# Patient Record
Sex: MALE | Race: WHITE | URBAN - NONMETROPOLITAN AREA
[De-identification: names, ages, dates, MRNs, and addresses within clinical notes are randomized per-mention and may not be internally consistent; named-entity substitution may affect disease eponyms.]

---

## 2021-02-08 ENCOUNTER — NURSE ONLY (OUTPATIENT)
Dept: LAB | Age: 76
End: 2021-02-08

## 2021-02-08 LAB
ALBUMIN SERPL-MCNC: 3.1 G/DL (ref 3.5–5.1)
ALP BLD-CCNC: 78 U/L (ref 38–126)
ALT SERPL-CCNC: 21 U/L (ref 11–66)
ANION GAP SERPL CALCULATED.3IONS-SCNC: 12 MEQ/L (ref 8–16)
AST SERPL-CCNC: 20 U/L (ref 5–40)
BILIRUB SERPL-MCNC: 1 MG/DL (ref 0.3–1.2)
BUN BLDV-MCNC: 20 MG/DL (ref 7–22)
CALCIUM SERPL-MCNC: 9.7 MG/DL (ref 8.5–10.5)
CHLORIDE BLD-SCNC: 100 MEQ/L (ref 98–111)
CO2: 25 MEQ/L (ref 23–33)
CREAT SERPL-MCNC: 0.9 MG/DL (ref 0.4–1.2)
GFR SERPL CREATININE-BSD FRML MDRD: 82 ML/MIN/1.73M2
GLUCOSE BLD-MCNC: 140 MG/DL (ref 70–108)
POTASSIUM SERPL-SCNC: 4.8 MEQ/L (ref 3.5–5.2)
PRO-BNP: 1972 PG/ML (ref 0–1800)
SODIUM BLD-SCNC: 137 MEQ/L (ref 135–145)
TOTAL PROTEIN: 6.7 G/DL (ref 6.1–8)

## 2021-03-24 ENCOUNTER — NURSE ONLY (OUTPATIENT)
Dept: LAB | Age: 76
End: 2021-03-24

## 2021-03-24 LAB
ALBUMIN SERPL-MCNC: 3.6 G/DL (ref 3.5–5.1)
ALP BLD-CCNC: 71 U/L (ref 38–126)
ALT SERPL-CCNC: 19 U/L (ref 11–66)
ANION GAP SERPL CALCULATED.3IONS-SCNC: 16 MEQ/L (ref 8–16)
AST SERPL-CCNC: 26 U/L (ref 5–40)
BILIRUB SERPL-MCNC: 0.6 MG/DL (ref 0.3–1.2)
BUN BLDV-MCNC: 16 MG/DL (ref 7–22)
CALCIUM SERPL-MCNC: 10 MG/DL (ref 8.5–10.5)
CHLORIDE BLD-SCNC: 98 MEQ/L (ref 98–111)
CHOLESTEROL, TOTAL: 174 MG/DL (ref 100–199)
CO2: 23 MEQ/L (ref 23–33)
CREAT SERPL-MCNC: 1 MG/DL (ref 0.4–1.2)
GFR SERPL CREATININE-BSD FRML MDRD: 73 ML/MIN/1.73M2
GLUCOSE BLD-MCNC: 120 MG/DL (ref 70–108)
HDLC SERPL-MCNC: 38 MG/DL
LDL CHOLESTEROL CALCULATED: 113 MG/DL
POTASSIUM SERPL-SCNC: 4.1 MEQ/L (ref 3.5–5.2)
PROSTATE SPECIFIC ANTIGEN: 0.23 NG/ML (ref 0–1)
SODIUM BLD-SCNC: 137 MEQ/L (ref 135–145)
TOTAL PROTEIN: 7.5 G/DL (ref 6.1–8)
TRIGL SERPL-MCNC: 114 MG/DL (ref 0–199)

## 2021-10-08 ENCOUNTER — NURSE ONLY (OUTPATIENT)
Dept: LAB | Age: 76
End: 2021-10-08

## 2021-10-08 LAB
ALBUMIN SERPL-MCNC: 3.8 G/DL (ref 3.5–5.1)
ALP BLD-CCNC: 70 U/L (ref 38–126)
ALT SERPL-CCNC: 26 U/L (ref 11–66)
ANION GAP SERPL CALCULATED.3IONS-SCNC: 11 MEQ/L (ref 8–16)
AST SERPL-CCNC: 31 U/L (ref 5–40)
BILIRUB SERPL-MCNC: 0.8 MG/DL (ref 0.3–1.2)
BUN BLDV-MCNC: 19 MG/DL (ref 7–22)
CALCIUM SERPL-MCNC: 9.6 MG/DL (ref 8.5–10.5)
CHLORIDE BLD-SCNC: 103 MEQ/L (ref 98–111)
CHOLESTEROL, TOTAL: 168 MG/DL (ref 100–199)
CO2: 24 MEQ/L (ref 23–33)
CREAT SERPL-MCNC: 1 MG/DL (ref 0.4–1.2)
GFR SERPL CREATININE-BSD FRML MDRD: 72 ML/MIN/1.73M2
GLUCOSE BLD-MCNC: 128 MG/DL (ref 70–108)
HDLC SERPL-MCNC: 37 MG/DL
LDL CHOLESTEROL CALCULATED: 114 MG/DL
POTASSIUM SERPL-SCNC: 4.3 MEQ/L (ref 3.5–5.2)
PROSTATE SPECIFIC ANTIGEN: 0.23 NG/ML (ref 0–1)
SODIUM BLD-SCNC: 138 MEQ/L (ref 135–145)
TOTAL PROTEIN: 7 G/DL (ref 6.1–8)
TRIGL SERPL-MCNC: 83 MG/DL (ref 0–199)

## 2024-12-20 RX ORDER — LATANOPROST 50 UG/ML
1 SOLUTION/ DROPS OPHTHALMIC NIGHTLY
COMMUNITY
Start: 2024-10-03

## 2024-12-20 RX ORDER — MULTIVIT WITH MINERALS/LUTEIN
1000 TABLET ORAL DAILY
COMMUNITY
Start: 2024-12-05

## 2024-12-20 RX ORDER — DORZOLAMIDE HYDROCHLORIDE AND TIMOLOL MALEATE 20; 5 MG/ML; MG/ML
1 SOLUTION/ DROPS OPHTHALMIC
COMMUNITY
Start: 2024-10-03

## 2024-12-20 RX ORDER — METOPROLOL TARTRATE 25 MG/1
25 TABLET, FILM COATED ORAL 2 TIMES DAILY
COMMUNITY
Start: 2024-08-30

## 2024-12-20 RX ORDER — FLUTICASONE PROPIONATE 50 MCG
SPRAY, SUSPENSION (ML) NASAL DAILY PRN
COMMUNITY
Start: 2024-10-03

## 2024-12-20 RX ORDER — MIRABEGRON 25 MG/1
25 TABLET, FILM COATED, EXTENDED RELEASE ORAL
COMMUNITY
Start: 2024-12-06

## 2024-12-20 RX ORDER — MULTIVIT-MIN/IRON/FOLIC ACID/K 18-600-40
1000 CAPSULE ORAL DAILY
COMMUNITY

## 2024-12-20 RX ORDER — GLIPIZIDE 5 MG/1
5 TABLET ORAL
COMMUNITY
Start: 2024-11-08

## 2024-12-20 RX ORDER — DONEPEZIL HYDROCHLORIDE 10 MG/1
10 TABLET, FILM COATED ORAL NIGHTLY
COMMUNITY
Start: 2024-10-03

## 2024-12-20 RX ORDER — GABAPENTIN 300 MG/1
300 CAPSULE ORAL
COMMUNITY
Start: 2024-10-03

## 2024-12-24 ENCOUNTER — ANESTHESIA EVENT (OUTPATIENT)
Dept: OPERATING ROOM | Age: 79
End: 2024-12-24
Payer: MEDICARE

## 2024-12-24 ENCOUNTER — APPOINTMENT (OUTPATIENT)
Dept: GENERAL RADIOLOGY | Age: 79
End: 2024-12-24
Attending: UROLOGY
Payer: MEDICARE

## 2024-12-24 ENCOUNTER — HOSPITAL ENCOUNTER (OUTPATIENT)
Age: 79
Setting detail: OUTPATIENT SURGERY
Discharge: HOME | End: 2024-12-24
Attending: UROLOGY | Admitting: UROLOGY
Payer: MEDICARE

## 2024-12-24 ENCOUNTER — ANESTHESIA (OUTPATIENT)
Dept: OPERATING ROOM | Age: 79
End: 2024-12-24
Payer: MEDICARE

## 2024-12-24 VITALS
DIASTOLIC BLOOD PRESSURE: 65 MMHG | WEIGHT: 266 LBS | OXYGEN SATURATION: 100 % | SYSTOLIC BLOOD PRESSURE: 117 MMHG | TEMPERATURE: 96.8 F | HEIGHT: 72 IN | RESPIRATION RATE: 16 BRPM | BODY MASS INDEX: 36.03 KG/M2 | HEART RATE: 68 BPM

## 2024-12-24 LAB
BUN BLD-MCNC: 27 MG/DL (ref 8–26)
EGFR, POC: 76 ML/MIN/1.73M2
EKG ATRIAL RATE: 69 BPM
EKG P AXIS: 22 DEGREES
EKG P-R INTERVAL: 194 MS
EKG Q-T INTERVAL: 360 MS
EKG QRS DURATION: 90 MS
EKG QTC CALCULATION (BAZETT): 385 MS
EKG R AXIS: 0 DEGREES
EKG T AXIS: 6 DEGREES
EKG VENTRICULAR RATE: 69 BPM
GLUCOSE BLD-MCNC: 121 MG/DL (ref 74–100)
POC CREATININE: 1 MG/DL (ref 0.51–1.19)

## 2024-12-24 PROCEDURE — 7100000010 HC PHASE II RECOVERY - FIRST 15 MIN: Performed by: UROLOGY

## 2024-12-24 PROCEDURE — 2500000003 HC RX 250 WO HCPCS: Performed by: UROLOGY

## 2024-12-24 PROCEDURE — C1769 GUIDE WIRE: HCPCS | Performed by: UROLOGY

## 2024-12-24 PROCEDURE — C2617 STENT, NON-COR, TEM W/O DEL: HCPCS | Performed by: UROLOGY

## 2024-12-24 PROCEDURE — 82947 ASSAY GLUCOSE BLOOD QUANT: CPT

## 2024-12-24 PROCEDURE — 3700000000 HC ANESTHESIA ATTENDED CARE: Performed by: UROLOGY

## 2024-12-24 PROCEDURE — 93005 ELECTROCARDIOGRAM TRACING: CPT | Performed by: STUDENT IN AN ORGANIZED HEALTH CARE EDUCATION/TRAINING PROGRAM

## 2024-12-24 PROCEDURE — 84520 ASSAY OF UREA NITROGEN: CPT

## 2024-12-24 PROCEDURE — 3700000001 HC ADD 15 MINUTES (ANESTHESIA): Performed by: UROLOGY

## 2024-12-24 PROCEDURE — 82565 ASSAY OF CREATININE: CPT

## 2024-12-24 PROCEDURE — 3600000013 HC SURGERY LEVEL 3 ADDTL 15MIN: Performed by: UROLOGY

## 2024-12-24 PROCEDURE — 7100000011 HC PHASE II RECOVERY - ADDTL 15 MIN: Performed by: UROLOGY

## 2024-12-24 PROCEDURE — 6360000002 HC RX W HCPCS: Performed by: NURSE ANESTHETIST, CERTIFIED REGISTERED

## 2024-12-24 PROCEDURE — C1758 CATHETER, URETERAL: HCPCS | Performed by: UROLOGY

## 2024-12-24 PROCEDURE — 3600000003 HC SURGERY LEVEL 3 BASE: Performed by: UROLOGY

## 2024-12-24 PROCEDURE — C1747 HC ENDOSCOPE, SINGLE, URINARY TRACT: HCPCS | Performed by: UROLOGY

## 2024-12-24 PROCEDURE — 2580000003 HC RX 258: Performed by: NURSE ANESTHETIST, CERTIFIED REGISTERED

## 2024-12-24 PROCEDURE — 2709999900 HC NON-CHARGEABLE SUPPLY: Performed by: UROLOGY

## 2024-12-24 PROCEDURE — 93010 ELECTROCARDIOGRAM REPORT: CPT | Performed by: INTERNAL MEDICINE

## 2024-12-24 DEVICE — URETERAL STENT
Type: IMPLANTABLE DEVICE | Site: URETER | Status: FUNCTIONAL
Brand: PERCUFLEX™ PLUS

## 2024-12-24 RX ORDER — ONDANSETRON 2 MG/ML
4 INJECTION INTRAMUSCULAR; INTRAVENOUS
Status: DISCONTINUED | OUTPATIENT
Start: 2024-12-24 | End: 2024-12-24 | Stop reason: HOSPADM

## 2024-12-24 RX ORDER — METOCLOPRAMIDE HYDROCHLORIDE 5 MG/ML
10 INJECTION INTRAMUSCULAR; INTRAVENOUS
Status: DISCONTINUED | OUTPATIENT
Start: 2024-12-24 | End: 2024-12-24 | Stop reason: HOSPADM

## 2024-12-24 RX ORDER — MAGNESIUM HYDROXIDE 1200 MG/15ML
LIQUID ORAL CONTINUOUS PRN
Status: DISCONTINUED | OUTPATIENT
Start: 2024-12-24 | End: 2024-12-24 | Stop reason: HOSPADM

## 2024-12-24 RX ORDER — PROPOFOL 10 MG/ML
INJECTION, EMULSION INTRAVENOUS
Status: DISCONTINUED | OUTPATIENT
Start: 2024-12-24 | End: 2024-12-24 | Stop reason: SDUPTHER

## 2024-12-24 RX ORDER — FENTANYL CITRATE 50 UG/ML
25 INJECTION, SOLUTION INTRAMUSCULAR; INTRAVENOUS EVERY 5 MIN PRN
Status: DISCONTINUED | OUTPATIENT
Start: 2024-12-24 | End: 2024-12-24 | Stop reason: HOSPADM

## 2024-12-24 RX ORDER — SULFAMETHOXAZOLE AND TRIMETHOPRIM 800; 160 MG/1; MG/1
1 TABLET ORAL 2 TIMES DAILY
Qty: 6 TABLET | Refills: 0 | Status: SHIPPED | OUTPATIENT
Start: 2024-12-24 | End: 2024-12-27

## 2024-12-24 RX ORDER — SODIUM CHLORIDE 0.9 % (FLUSH) 0.9 %
5-40 SYRINGE (ML) INJECTION PRN
Status: DISCONTINUED | OUTPATIENT
Start: 2024-12-24 | End: 2024-12-24 | Stop reason: HOSPADM

## 2024-12-24 RX ORDER — NALOXONE HYDROCHLORIDE 0.4 MG/ML
INJECTION, SOLUTION INTRAMUSCULAR; INTRAVENOUS; SUBCUTANEOUS PRN
Status: DISCONTINUED | OUTPATIENT
Start: 2024-12-24 | End: 2024-12-24 | Stop reason: HOSPADM

## 2024-12-24 RX ORDER — PHENYLEPHRINE HCL IN 0.9% NACL 1 MG/10 ML
SYRINGE (ML) INTRAVENOUS
Status: DISCONTINUED | OUTPATIENT
Start: 2024-12-24 | End: 2024-12-24 | Stop reason: SDUPTHER

## 2024-12-24 RX ORDER — SODIUM CHLORIDE 9 MG/ML
INJECTION, SOLUTION INTRAVENOUS PRN
Status: DISCONTINUED | OUTPATIENT
Start: 2024-12-24 | End: 2024-12-24 | Stop reason: HOSPADM

## 2024-12-24 RX ORDER — SULFAMETHOXAZOLE AND TRIMETHOPRIM 800; 160 MG/1; MG/1
1 TABLET ORAL 2 TIMES DAILY
Qty: 6 TABLET | Refills: 0 | Status: SHIPPED | OUTPATIENT
Start: 2024-12-24 | End: 2024-12-24

## 2024-12-24 RX ORDER — LIDOCAINE HYDROCHLORIDE 10 MG/ML
INJECTION, SOLUTION INFILTRATION; PERINEURAL
Status: DISCONTINUED | OUTPATIENT
Start: 2024-12-24 | End: 2024-12-24 | Stop reason: SDUPTHER

## 2024-12-24 RX ORDER — SODIUM CHLORIDE, SODIUM LACTATE, POTASSIUM CHLORIDE, CALCIUM CHLORIDE 600; 310; 30; 20 MG/100ML; MG/100ML; MG/100ML; MG/100ML
INJECTION, SOLUTION INTRAVENOUS
Status: DISCONTINUED | OUTPATIENT
Start: 2024-12-24 | End: 2024-12-24 | Stop reason: SDUPTHER

## 2024-12-24 RX ORDER — SODIUM CHLORIDE 0.9 % (FLUSH) 0.9 %
5-40 SYRINGE (ML) INJECTION EVERY 12 HOURS SCHEDULED
Status: DISCONTINUED | OUTPATIENT
Start: 2024-12-24 | End: 2024-12-24 | Stop reason: HOSPADM

## 2024-12-24 RX ORDER — FENTANYL CITRATE 50 UG/ML
50 INJECTION, SOLUTION INTRAMUSCULAR; INTRAVENOUS EVERY 5 MIN PRN
Status: DISCONTINUED | OUTPATIENT
Start: 2024-12-24 | End: 2024-12-24 | Stop reason: HOSPADM

## 2024-12-24 RX ADMIN — PROPOFOL 125 MCG/KG/MIN: 10 INJECTION, EMULSION INTRAVENOUS at 07:36

## 2024-12-24 RX ADMIN — Medication 100 MCG: at 07:52

## 2024-12-24 RX ADMIN — LIDOCAINE HYDROCHLORIDE 50 MG: 10 INJECTION, SOLUTION INFILTRATION; PERINEURAL at 07:36

## 2024-12-24 RX ADMIN — Medication 100 MCG: at 07:50

## 2024-12-24 RX ADMIN — SODIUM CHLORIDE, POTASSIUM CHLORIDE, SODIUM LACTATE AND CALCIUM CHLORIDE: 600; 310; 30; 20 INJECTION, SOLUTION INTRAVENOUS at 06:52

## 2024-12-24 RX ADMIN — PROPOFOL 30 MG: 10 INJECTION, EMULSION INTRAVENOUS at 07:38

## 2024-12-24 RX ADMIN — Medication 3000 MG: at 07:44

## 2024-12-24 ASSESSMENT — PAIN - FUNCTIONAL ASSESSMENT: PAIN_FUNCTIONAL_ASSESSMENT: 0-10

## 2024-12-24 NOTE — ANESTHESIA PRE PROCEDURE
Department of Anesthesiology  Preprocedure Note       Name:  Irwin Lr   Age:  79 y.o.  :  1945                                          MRN:  [id number]         Date:  2024      Surgeon: Surgeon(s):  Frankie Lala MD    Procedure: Procedure(s):  CYSTOSCOPY URETEROSCOPY, STENT EXCHANGE    Medications prior to admission:   Prior to Admission medications    Medication Sig Start Date End Date Taking? Authorizing Provider   Loratadine-Pseudoephedrine (CLARITIN-D 24 HOUR PO) 1 tabs, Oral, Daily, PRN, 0 Refill(s) 10/3/24  Yes Provider, MD Heidy   Zinc Sulfate 140 (50 Zn) MG TABS daily 24  Yes Provider, MD Heidy   vitamin D 50 MCG (2000 UT) CAPS capsule Take 1,000 Units by mouth daily   Yes Provider, MD Heidy   Ascorbic Acid (VITAMIN C) 1000 MG tablet Take 1 tablet by mouth daily 24  Yes Provider, MD Heidy   apixaban (ELIQUIS) 5 MG TABS tablet Take 1 tablet by mouth 2 times daily 24  Yes Provider, MD Heidy   gabapentin (NEURONTIN) 300 MG capsule Take 1 capsule by mouth. 10/3/24  Yes Provider, MD Heidy   donepezil (ARICEPT) 10 MG tablet Take 1 tablet by mouth nightly 10/3/24  Yes Provider, MD Heidy   glipiZIDE (GLUCOTROL) 5 MG tablet Take 1 tablet by mouth 2 times daily (before meals) 24  Yes Provider, MD Heidy   metoprolol tartrate (LOPRESSOR) 25 MG tablet Take 1 tablet by mouth 2 times daily 24  Yes Provider, MD Heidy   latanoprost (XALATAN) 0.005 % ophthalmic solution 1 drop nightly 10/3/24  Yes Provider, MD Heidy   fluticasone (FLONASE) 50 MCG/ACT nasal spray by Nasal route daily as needed 10/3/24  Yes Provider, MD Heidy   dorzolamide-timolol (COSOPT) 2-0.5 % ophthalmic solution 1 drop 10/3/24  Yes Provider, MD Heidy   mirabegron (MYRBETRIQ) 25 MG TB24 1 tablet 24  Yes Provider, MD Heidy   Zn-Pyg Afri-Nettle-Saw Palmet (SAW PALMETTO COMPLEX PO) 1 caps, Oral, Daily, 0 Refill(s) 10/3/24  Yes

## 2024-12-24 NOTE — DISCHARGE INSTRUCTIONS
Called patient's phone, no answer.    Called spouse's phone, spoke with spouse Aure.  States that they have not spoken to cardiologist \"WHY?\". Stressed the importance of the cardiologist being aware of blood pressure readings and recent fall.   Spouse expresses frustration, \"JUST DO SOMETHING TO FIX HIS BLOOD PRESSURE!\".  Discussed holding his metoprolol. Spouse agreeable, patient in background agrees.  Inquired regarding patient's fluid intake. Spouse reports \"not good\". Asked for approximate amount. Spouse states, \"maybe 12 oz a day, it's always been an issue\".    Encouraged patient to take sips throughout the day.     Spouse reported back to writer to hold metoprolol, call cardiologist Halle at [facility] tomorrow and continue to work on fluid intake.   Ureteroscopy and stent placement:  You may see blood in the urine after the procedure.  This should resolve over the next couple days.  Please stay hydrated.  You may see intermittent blood in the urine while the stent is in place.  This is expected.  You may experience flank pain, and/or frequency/urgency of urination while the stent is in place.    Pt ok to discharge home in good condition  No heavy lifting, >10 lbs for today  Pt should avoid strenuous activity for today  Pt should walk moderately at home  Pt ok to shower   Pt may resume diet as tolerated  Pt should take Rx as directed  No driving while on narcotics  Please call attending physician or hospital  with questions  Call or Present to ED if fever (> 101F), intractable nausea vomiting or pain.  Rx in chart    Pt should follow up with Dr. Lala, in 1-2 weeks, call to confirm appointment

## 2024-12-24 NOTE — ANESTHESIA POSTPROCEDURE EVALUATION
Department of Anesthesiology  Postprocedure Note    Patient: Irwin Lr  MRN: [id number]  YOB: 1945  Date of evaluation: 12/24/2024    Procedure Summary       Date: 12/24/24 Room / Location: [address]    Anesthesia Start: 0732 Anesthesia Stop: 0806    Procedure: CYSTOSCOPY LEFT URETEROSCOPY,  LEFT STENT EXCHANGE (Left: Bladder) Diagnosis:       Urothelial cancer (HCC)      (Urothelial cancer (HCC) [C68.9])    Surgeons: Frankie Lala MD Responsible Provider: Maria Antonia Fernandez MD    Anesthesia Type: MAC ASA Status: 3            Anesthesia Type: MAC    [place] Phase I:      [place] Phase II: [place] Score: 10    Anesthesia Post Evaluation    Patient location during evaluation: PACU  Patient participation: complete - patient participated  Level of consciousness: awake and alert  Pain score: 2  Airway patency: patent  Nausea & Vomiting: no nausea and no vomiting  Cardiovascular status: hemodynamically stable  Respiratory status: acceptable  Hydration status: euvolemic  Pain management: adequate    No notable events documented.

## 2024-12-24 NOTE — OP NOTE
bladder did have significant bleeding, did not get a full view but did not note any obvious tumors or other lesions.  The distal curl of the left ureteral stent was in position.  We used a stent grasper to remove this to the urethral meatus.  We cannulated it with a Glidewire, advancing into the renal pelvis under fluoroscopy.  Using a 10 Samoan dual-lumen catheter, we placed a second wire.  Over our working wire, we advanced a flexible disposable ureteroscope into the renal pelvis with ease.  We then removed the working wire and began pyeloscopy.  We noted an upper pole tumor, approximately 2 cm with papillary features, appeared low-grade.  We noted another satellite lesion of the UPJ.  We moved our scope down the ureter and noted the known distal ureteral tumor with obstructive nature, papillary features with surface calcifications.  The scope was fully removed.  We then backloaded the cystoscope on the wire and placed a 5 Samoan x 28 cm double-J ureteral stent to the usual fashion without strings, noting good proximal and distal curl.  The scope was removed and bladder drained.  The patient was awoken and taken the PACU in good condition.    Please note that Dr. Lala was present for all critical portions of the procedure.    Plan:  Will discharge with a short course of antibiotics, follow-up with Dr. Lala in 1 to 2 weeks to discuss results and next steps.    Fernando Berger MD  Urology Resident, PGY-4

## 2024-12-24 NOTE — H&P
William Khan, Agustina Waller Mostafa, Kev, Radha Jr.  Urology History & Physical      Patient:  Irwin Lr  MRN: [id number]  YOB: 1945    CHIEF COMPLAINT: Concern for left ureteral tumor    HISTORY OF PRESENT ILLNESS:   The patient is a 79 y.o. male with history of bladder cancer, status post TURBT, presenting with outside images demonstrating left hydroureteronephrosis down to the level of the UVJ, biopsy did reveal urothelial carcinoma.    Patient's old records, notes and chart reviewed and summarized above.    Past Medical History:    Past Medical History:   Diagnosis Date    Arthritis     general    Atrial fibrillation (HCC)     BPH (benign prostatic hypertrophy)     Cancer (HCC)     bladder    Cataract, left eye     COVID 2020    Diabetes mellitus (HCC)     GERD (gastroesophageal reflux disease)     mild    Hx of blood clots     PE when he had covid in 2020    Hypertension     PONV (postoperative nausea and vomiting)     Seasonal allergies     Under care of team 12/20/2024    PCP: Martin Jean    Under care of team 12/20/2024    Cardiologist: Alicia Ceja    Urothelial cancer ([facility])     Wears hearing aid        Past Surgical History:    Past Surgical History:   Procedure Laterality Date    BLADDER TUMOR EXCISION  08/18/2016    TUR    COLONOSCOPY      CYSTOSCOPY      CYSTOSCOPY  09/28/2016    with TUR-BT fulgerration    CYSTOURETHROSCOPY/URETHRAL DILATION Right 12/21/2016    stent exchange    HAND TENDON SURGERY Left     HERNIA REPAIR  2009    umbilical       Medications:    No current facility-administered medications for this encounter.    Allergies:  No Known Allergies    Social History:   Social History     Socioeconomic History    Marital status:      Spouse name: Not on file    Number of children: Not on file    Years of education: Not on file    Highest education level: Not on file   Occupational History    Not on file   Tobacco Use    Smoking status:  Former     Current packs/day: 0.00     Types: Cigarettes     Quit date: 1986     Years since quittin.2    Smokeless tobacco: Not on file   Substance and Sexual Activity    Alcohol use: No    Drug use: No    Sexual activity: Not on file   Other Topics Concern    Not on file   Social History Narrative    Not on file     Social Determinants of Health     Financial Resource Strain: Not on file   Food Insecurity: Not on file   Transportation Needs: Not on file   Physical Activity: Not on file   Stress: Not on file   Social Connections: Not on file   Intimate Partner Violence: Not on file   Housing Stability: Not on file       Family History:    Family History   Problem Relation Age of Onset    Cancer Mother     Cancer Father        REVIEW OF SYSTEMS:  A comprehensive 14 point review of systems was obtained.  Constitutional: No fatigue  Eyes: No blurry vision  Ears, nose, mouth, throat, face: No ringing in the ears; no facial droop.  Respiratory: No cough or cold.  Cardiovascular: No palpitations  Gastrointestinal: No diarrhea or constipation.  Genitourinary: No burning with urination  Integument/Skin: No rashes  Hematologic/Lymphatic: No easy bruising  Musculoskeletal: No muscle pains  Neurologic: No weakness in the extremities.   Psychiatric: No depression or suicidal thoughts.  Endocrine: No heat or cold intolerances.  Allergic/Immunologic: No current seasonal allergies; no skin hives.      Physical Exam:    This a 79 y.o. Male     Constitutional: Patient in no acute distress.  Neuro: alert and oriented to person place and time.  HEENT: No acute abnormalities  Lungs: Respiratory effort normal on room air  Cardiovascular:  Heart rate regular rate and rhythm  Abdomen: Soft, non-tender, non-distended. Non peritonitic  Extremities: No leg swelling, no edema  : No Mccoy catheter    Labs:  No results for input(s): \"WBC\", \"HGB\", \"HCT\", \"MCV\", \"PLT\" in the last 72 hours.  No results for input(s): \"NA\", \"K\", \"CL\",

## 2024-12-27 LAB — GLUCOSE BLD-MCNC: 120 MG/DL (ref 75–110)

## 2025-03-05 RX ORDER — SODIUM CHLORIDE, SODIUM LACTATE, POTASSIUM CHLORIDE, CALCIUM CHLORIDE 600; 310; 30; 20 MG/100ML; MG/100ML; MG/100ML; MG/100ML
INJECTION, SOLUTION INTRAVENOUS CONTINUOUS
OUTPATIENT
Start: 2025-03-05

## 2025-03-05 NOTE — DISCHARGE INSTRUCTIONS
Pre-operative Instructions    Please arrive at the surgery center by 10:00 AM on 3/26/2025  (or as directed by your surgeon's office). See Directons to Surgery Center below.                FASTING    NOTHING TO EAT OR DRINK AFTER MIDNIGHT the night prior to surgery (This includes gum, candy, mints, chewing tobacco, etc).   (Follow bowel prep instructions if instructed by your surgeon.)                MEDICATIONS    What to STOP: ANY BLOOD THINNING MEDICATION(S) as directed by your surgeon or prescribing physician.  FAILURE TO STOP CERTAIN MEDICATIONS MAY INTERFERE WITH YOUR SCHEDULED SURGERY.      According to the medication list you provided today, PLEASE STOP: ELIQUIS AS DIRECTED BY YOUR PRESCRIBING PROVIDER; ibuprofen    2. What to CONTINUE leading up to your surgery:   Please take all your other daily medications except the medications listed above that you were instructed to hold.    3. What to TAKE MORNING OF SURGERY with SMALL SIP OF WATER: metoprolol (Lopressor), gabapentin (Neurontin), donepezil (Aricept)    Hold lisinopril morning of surgery.    PLEASE NOTE: ANY \"STOPPED\" MEDICATIONS MAY BE DUE TO CLEANING UP MEDICATION LIST DURING THIS VISIT.                        IF APPLICABLE:  -If you have been given a blood band, you must bring it with you the day of surgery, unclasped.  -Use routine inhalers and bring inhalers the day of surgery.   -Bring C-Pap/Bi-pap with you morning of surgery if planning on staying in the hospital overnight.  -Do not take diabetic medications on the day of surgery.  -Any weekly antidiabetic injections must be stopped one week prior to surgery and plan discussed with prescribing provider.                             OTHER IMPORTANT REMINDERS    1) You may be required to provide a urine sample upon your arrival to the pre-op area, so please take this into consideration.     2) If  NOT planning on staying in the hospital overnight :    A.You will need an adult family member

## 2025-03-10 ENCOUNTER — HOSPITAL ENCOUNTER (OUTPATIENT)
Dept: PREADMISSION TESTING | Age: 80
Discharge: HOME | End: 2025-03-14
Payer: MEDICARE

## 2025-03-10 VITALS
HEART RATE: 70 BPM | SYSTOLIC BLOOD PRESSURE: 154 MMHG | TEMPERATURE: 98.4 F | HEIGHT: 70 IN | DIASTOLIC BLOOD PRESSURE: 83 MMHG | RESPIRATION RATE: 18 BRPM | WEIGHT: 266 LBS | OXYGEN SATURATION: 97 % | BODY MASS INDEX: 38.08 KG/M2

## 2025-03-10 LAB
ABO + RH BLD: NORMAL
ANION GAP SERPL CALCULATED.3IONS-SCNC: 10 MMOL/L (ref 9–16)
BLOOD BANK SAMPLE EXPIRATION: NORMAL
BLOOD GROUP ANTIBODIES SERPL: NEGATIVE
BUN SERPL-MCNC: 23 MG/DL (ref 8–23)
CALCIUM SERPL-MCNC: 9.3 MG/DL (ref 8.6–10.4)
CHLORIDE SERPL-SCNC: 108 MMOL/L (ref 98–107)
CO2 SERPL-SCNC: 22 MMOL/L (ref 20–31)
CREAT SERPL-MCNC: 0.9 MG/DL (ref 0.7–1.2)
ERYTHROCYTE [DISTWIDTH] IN BLOOD BY AUTOMATED COUNT: 13.6 % (ref 11.8–14.4)
GFR, ESTIMATED: 86 ML/MIN/1.73M2
GLUCOSE SERPL-MCNC: 94 MG/DL (ref 74–99)
HCT VFR BLD AUTO: 47.4 % (ref 40.7–50.3)
HGB BLD-MCNC: 15.1 G/DL (ref 13–17)
MCH RBC QN AUTO: 31.7 PG (ref 25.2–33.5)
MCHC RBC AUTO-ENTMCNC: 31.9 G/DL (ref 28.4–34.8)
MCV RBC AUTO: 99.6 FL (ref 82.6–102.9)
NRBC BLD-RTO: 0 PER 100 WBC
PLATELET # BLD AUTO: 237 K/UL (ref 138–453)
PMV BLD AUTO: 8.8 FL (ref 8.1–13.5)
POTASSIUM SERPL-SCNC: 4.4 MMOL/L (ref 3.7–5.3)
RBC # BLD AUTO: 4.76 M/UL (ref 4.21–5.77)
SODIUM SERPL-SCNC: 140 MMOL/L (ref 136–145)
WBC OTHER # BLD: 8.3 K/UL (ref 3.5–11.3)

## 2025-03-10 PROCEDURE — 87086 URINE CULTURE/COLONY COUNT: CPT

## 2025-03-10 PROCEDURE — 86850 RBC ANTIBODY SCREEN: CPT

## 2025-03-10 PROCEDURE — 85027 COMPLETE CBC AUTOMATED: CPT

## 2025-03-10 PROCEDURE — 86901 BLOOD TYPING SEROLOGIC RH(D): CPT

## 2025-03-10 PROCEDURE — 36415 COLL VENOUS BLD VENIPUNCTURE: CPT

## 2025-03-10 PROCEDURE — 86900 BLOOD TYPING SEROLOGIC ABO: CPT

## 2025-03-10 PROCEDURE — 93005 ELECTROCARDIOGRAM TRACING: CPT | Performed by: STUDENT IN AN ORGANIZED HEALTH CARE EDUCATION/TRAINING PROGRAM

## 2025-03-10 PROCEDURE — 80048 BASIC METABOLIC PNL TOTAL CA: CPT

## 2025-03-10 RX ORDER — ERYTHROMYCIN 5 MG/G
OINTMENT OPHTHALMIC NIGHTLY
COMMUNITY

## 2025-03-10 RX ORDER — HEPARIN SODIUM 5000 [USP'U]/ML
5000 INJECTION, SOLUTION INTRAVENOUS; SUBCUTANEOUS ONCE
OUTPATIENT
Start: 2025-03-10 | End: 2025-03-10

## 2025-03-10 NOTE — H&P
History and Physical    Pt Name: Irwin Lr  MRN: [id number]  YOB: 1945  Date of evaluation: 3/10/2025  Primary Care Physician: Washington Clement MD    SUBJECTIVE:   History of Chief Complaint:    Irwin Lr is a 79 y.o. male who presents for PAT appointment. Patient is a vague historian but reports he noticed hematuria in December of 2024. He denies any pain and states he is able to urinate without issues. He follows with urology due to history of bladder cancer. Patient was recently diagnosed with urothelial cancer. Patient has been scheduled for     ROBOTIC LAPAROSCOPIC NEPHROURETERECTOMY (GEMZAR ORDERED) - Left     Allergies  has no known allergies.  Medications  Prior to Admission medications    Medication Sig Start Date End Date Taking? Authorizing Provider   erythromycin (ROMYCIN) 5 MG/GM ophthalmic ointment Place into both eyes nightly   Yes Heidy Cervantes MD   carboxymethylcellulose 1 % ophthalmic solution Place 1 drop into both eyes 3 times daily   Yes Heidy Cervantes MD   Loratadine-Pseudoephedrine (CLARITIN-D 24 HOUR PO) 1 tabs, Oral, Daily, PRN, 0 Refill(s) 10/3/24  Yes Heidy Cervantes MD   Zinc Sulfate 140 (50 Zn) MG TABS daily 12/5/24  Yes Heidy Cervantes MD   vitamin D 50 MCG (2000 UT) CAPS capsule Take 1,000 Units by mouth daily   Yes Heidy Cervantes MD   Ascorbic Acid (VITAMIN C) 1000 MG tablet Take 1 tablet by mouth daily 12/5/24  Yes Heidy Cervantes MD   gabapentin (NEURONTIN) 300 MG capsule Take 1 capsule by mouth. 10/3/24  Yes Heidy Cervantes MD   donepezil (ARICEPT) 10 MG tablet Take 1 tablet by mouth nightly 10/3/24  Yes Heidy Cervantes MD   glipiZIDE (GLUCOTROL) 5 MG tablet Take 1 tablet by mouth 2 times daily (before meals) 11/8/24  Yes Heidy Cervantes MD   metoprolol tartrate (LOPRESSOR) 25 MG tablet Take 1 tablet by mouth 2 times daily 8/30/24  Yes Heidy Cervantse MD   latanoprost (XALATAN) 0.005 %

## 2025-03-10 NOTE — H&P (VIEW-ONLY)
History and Physical    Pt Name: Irwin Lr  MRN: [id number]  YOB: 1945  Date of evaluation: 3/10/2025  Primary Care Physician: Washington Clement MD    SUBJECTIVE:   History of Chief Complaint:    Irwin Lr is a 79 y.o. male who presents for PAT appointment. Patient is a vague historian but reports he noticed hematuria in December of 2024. He denies any pain and states he is able to urinate without issues. He follows with urology due to history of bladder cancer. Patient was recently diagnosed with urothelial cancer. Patient has been scheduled for     ROBOTIC LAPAROSCOPIC NEPHROURETERECTOMY (GEMZAR ORDERED) - Left     Allergies  has no known allergies.  Medications  Prior to Admission medications    Medication Sig Start Date End Date Taking? Authorizing Provider   erythromycin (ROMYCIN) 5 MG/GM ophthalmic ointment Place into both eyes nightly   Yes Heidy Cervantes MD   carboxymethylcellulose 1 % ophthalmic solution Place 1 drop into both eyes 3 times daily   Yes Heidy Cervantes MD   Loratadine-Pseudoephedrine (CLARITIN-D 24 HOUR PO) 1 tabs, Oral, Daily, PRN, 0 Refill(s) 10/3/24  Yes Heidy Cervantes MD   Zinc Sulfate 140 (50 Zn) MG TABS daily 12/5/24  Yes Heidy Cervantes MD   vitamin D 50 MCG (2000 UT) CAPS capsule Take 1,000 Units by mouth daily   Yes Heidy Cervantes MD   Ascorbic Acid (VITAMIN C) 1000 MG tablet Take 1 tablet by mouth daily 12/5/24  Yes Heidy Cervantes MD   gabapentin (NEURONTIN) 300 MG capsule Take 1 capsule by mouth. 10/3/24  Yes Heidy Cervantes MD   donepezil (ARICEPT) 10 MG tablet Take 1 tablet by mouth nightly 10/3/24  Yes Heidy Cervantes MD   glipiZIDE (GLUCOTROL) 5 MG tablet Take 1 tablet by mouth 2 times daily (before meals) 11/8/24  Yes Heidy Cervantes MD   metoprolol tartrate (LOPRESSOR) 25 MG tablet Take 1 tablet by mouth 2 times daily 8/30/24  Yes Heidy Cervantes MD   latanoprost (XALATAN) 0.005 %    EXTREMITIES: No edema to bilateral lower extremities.  No varicosities to bilateral lower extremities.   NEUROLOGIC: CN II-XII are grossly intact. Gait is smooth.  Testing:   EKG: 3/10/2025  Labs pending: drawn 3/10/2025   IMPRESSIONS:   Urothelial cancer.   PLANS:       ROBOTIC LAPAROSCOPIC NEPHROURETERECTOMY (GEMZAR ORDERED) - Left       STACI Byrne - CNP  Electronically signed 3/10/2025 at 1:57 PM

## 2025-03-10 NOTE — PROGRESS NOTES
Anesthesia Focused Assessment    Hx of anesthesia complications:  PONV x 1 occurrence \"many years ago\"  Family hx of anesthesia complications:  no      Tested positive for Covid-19 in last 8 weeks: no  Upper respiratory infection within the last 4 weeks: no      STOP-BANG Sleep Apnea Questionnaire    SNORE loudly (heard through closed doors)?   Yes  TIRED, fatigued, sleepy during daytime?    No  OBSERVED stopping breathing during sleep?   No  High blood PRESSURE or being treated?    Yes    BMI over 35?        Yes  AGE over 50?        Yes  NECK circumference over 16\"?     No  GENDER (male)?       Yes             Total 5  High risk 5-8  Intermediate risk 3-4  Low risk 0-2    ----------------------------------------------------------------------------------------------------------------------  ROBBIE                              No  If yes, machine?      No    DM1                                            No  DM2                   Yes, oral meds    Coronary Artery Disease      No  HTN         Yes, on meds  Defib/AICD/Pacemaker               No             Renal Failure                   No  If yes, on dialysis           Active smoker?       No  Drinks alcohol?       No  Illicit drugs?        No  Dentition?        benign      Past Medical History:   Diagnosis Date    Arthritis     general    Atrial fibrillation (HCC)     resolved    BPH (benign prostatic hypertrophy)     Cancer (HCC)     bladder    Cataract, left eye     COVID 2020    Diabetes mellitus (HCC)     GERD (gastroesophageal reflux disease)     mild    Hx of blood clots     PE when he had covid in 2020    Hypertension     PONV (postoperative nausea and vomiting)     Seasonal allergies     Under care of service provider     [id number]-[id number]-last visit dec 2024    Under care of team     PCP: Martin Jean-due to visit march 17, 2025    Under care of team     Cardiologist: Alicia Ceja-last visit june 2024    Urothelial cancer (HCC)     Wears

## 2025-03-11 LAB
EKG ATRIAL RATE: 65 BPM
EKG P AXIS: 47 DEGREES
EKG P-R INTERVAL: 196 MS
EKG Q-T INTERVAL: 370 MS
EKG QRS DURATION: 90 MS
EKG QTC CALCULATION (BAZETT): 384 MS
EKG R AXIS: 27 DEGREES
EKG T AXIS: 40 DEGREES
EKG VENTRICULAR RATE: 65 BPM
MICROORGANISM SPEC CULT: NO GROWTH
SPECIMEN DESCRIPTION: NORMAL

## 2025-03-26 ENCOUNTER — ANESTHESIA (OUTPATIENT)
Dept: OPERATING ROOM | Age: 80
DRG: 658 | End: 2025-03-26
Payer: MEDICARE

## 2025-03-26 ENCOUNTER — ANESTHESIA EVENT (OUTPATIENT)
Dept: OPERATING ROOM | Age: 80
DRG: 658 | End: 2025-03-26
Payer: MEDICARE

## 2025-03-26 ENCOUNTER — HOSPITAL ENCOUNTER (INPATIENT)
Age: 80
LOS: 1 days | Discharge: HOME | DRG: 657 | End: 2025-03-27
Attending: UROLOGY | Admitting: UROLOGY
Payer: MEDICARE

## 2025-03-26 DIAGNOSIS — C68.9 UROTHELIAL CANCER (HCC): ICD-10-CM

## 2025-03-26 PROBLEM — C64.2 UROTHELIAL CARCINOMA OF KIDNEY, LEFT: Status: ACTIVE | Noted: 2025-03-26

## 2025-03-26 LAB
ANION GAP SERPL CALCULATED.3IONS-SCNC: 14 MMOL/L (ref 9–16)
BUN SERPL-MCNC: 21 MG/DL (ref 8–23)
CALCIUM SERPL-MCNC: 9 MG/DL (ref 8.6–10.4)
CHLORIDE SERPL-SCNC: 103 MMOL/L (ref 98–107)
CO2 SERPL-SCNC: 21 MMOL/L (ref 20–31)
CREAT SERPL-MCNC: 1.2 MG/DL (ref 0.7–1.2)
ERYTHROCYTE [DISTWIDTH] IN BLOOD BY AUTOMATED COUNT: 12.9 % (ref 11.8–14.4)
GFR, ESTIMATED: 61 ML/MIN/1.73M2
GLUCOSE BLD-MCNC: 102 MG/DL (ref 75–110)
GLUCOSE BLD-MCNC: 150 MG/DL (ref 75–110)
GLUCOSE SERPL-MCNC: 148 MG/DL (ref 74–99)
HCT VFR BLD AUTO: 44.6 % (ref 40.7–50.3)
HGB BLD-MCNC: 14.4 G/DL (ref 13–17)
MAGNESIUM SERPL-MCNC: 1.9 MG/DL (ref 1.6–2.4)
MCH RBC QN AUTO: 31.5 PG (ref 25.2–33.5)
MCHC RBC AUTO-ENTMCNC: 32.3 G/DL (ref 28.4–34.8)
MCV RBC AUTO: 97.6 FL (ref 82.6–102.9)
NRBC BLD-RTO: 0 PER 100 WBC
PHOSPHATE SERPL-MCNC: 4.2 MG/DL (ref 2.5–4.5)
PLATELET # BLD AUTO: 225 K/UL (ref 138–453)
PMV BLD AUTO: 8.8 FL (ref 8.1–13.5)
POTASSIUM SERPL-SCNC: 4.6 MMOL/L (ref 3.7–5.3)
RBC # BLD AUTO: 4.57 M/UL (ref 4.21–5.77)
SODIUM SERPL-SCNC: 138 MMOL/L (ref 136–145)
WBC OTHER # BLD: 12.2 K/UL (ref 3.5–11.3)

## 2025-03-26 PROCEDURE — 85027 COMPLETE CBC AUTOMATED: CPT

## 2025-03-26 PROCEDURE — 3700000001 HC ADD 15 MINUTES (ANESTHESIA): Performed by: UROLOGY

## 2025-03-26 PROCEDURE — 36415 COLL VENOUS BLD VENIPUNCTURE: CPT

## 2025-03-26 PROCEDURE — 2500000003 HC RX 250 WO HCPCS: Performed by: NURSE ANESTHETIST, CERTIFIED REGISTERED

## 2025-03-26 PROCEDURE — 8E0W4CZ ROBOTIC ASSISTED PROCEDURE OF TRUNK REGION, PERCUTANEOUS ENDOSCOPIC APPROACH: ICD-10-PCS | Performed by: UROLOGY

## 2025-03-26 PROCEDURE — S2900 ROBOTIC SURGICAL SYSTEM: HCPCS | Performed by: UROLOGY

## 2025-03-26 PROCEDURE — 2500000003 HC RX 250 WO HCPCS: Performed by: UROLOGY

## 2025-03-26 PROCEDURE — 3700000000 HC ANESTHESIA ATTENDED CARE: Performed by: UROLOGY

## 2025-03-26 PROCEDURE — 82947 ASSAY GLUCOSE BLOOD QUANT: CPT

## 2025-03-26 PROCEDURE — 76942 ECHO GUIDE FOR BIOPSY: CPT | Performed by: STUDENT IN AN ORGANIZED HEALTH CARE EDUCATION/TRAINING PROGRAM

## 2025-03-26 PROCEDURE — 83735 ASSAY OF MAGNESIUM: CPT

## 2025-03-26 PROCEDURE — 7100000001 HC PACU RECOVERY - ADDTL 15 MIN: Performed by: UROLOGY

## 2025-03-26 PROCEDURE — 3600000019 HC SURGERY ROBOT ADDTL 15MIN: Performed by: UROLOGY

## 2025-03-26 PROCEDURE — 2580000003 HC RX 258

## 2025-03-26 PROCEDURE — 6360000002 HC RX W HCPCS: Performed by: UROLOGY

## 2025-03-26 PROCEDURE — 2720000010 HC SURG SUPPLY STERILE: Performed by: UROLOGY

## 2025-03-26 PROCEDURE — 2500000003 HC RX 250 WO HCPCS

## 2025-03-26 PROCEDURE — 2709999900 HC NON-CHARGEABLE SUPPLY: Performed by: UROLOGY

## 2025-03-26 PROCEDURE — 6360000002 HC RX W HCPCS: Performed by: NURSE ANESTHETIST, CERTIFIED REGISTERED

## 2025-03-26 PROCEDURE — 80048 BASIC METABOLIC PNL TOTAL CA: CPT

## 2025-03-26 PROCEDURE — 0TT74ZZ RESECTION OF LEFT URETER, PERCUTANEOUS ENDOSCOPIC APPROACH: ICD-10-PCS | Performed by: UROLOGY

## 2025-03-26 PROCEDURE — C1889 IMPLANT/INSERT DEVICE, NOC: HCPCS | Performed by: UROLOGY

## 2025-03-26 PROCEDURE — 6360000002 HC RX W HCPCS: Performed by: STUDENT IN AN ORGANIZED HEALTH CARE EDUCATION/TRAINING PROGRAM

## 2025-03-26 PROCEDURE — 2580000003 HC RX 258: Performed by: STUDENT IN AN ORGANIZED HEALTH CARE EDUCATION/TRAINING PROGRAM

## 2025-03-26 PROCEDURE — 88313 SPECIAL STAINS GROUP 2: CPT

## 2025-03-26 PROCEDURE — 6370000000 HC RX 637 (ALT 250 FOR IP): Performed by: STUDENT IN AN ORGANIZED HEALTH CARE EDUCATION/TRAINING PROGRAM

## 2025-03-26 PROCEDURE — 88305 TISSUE EXAM BY PATHOLOGIST: CPT

## 2025-03-26 PROCEDURE — 0TT14ZZ RESECTION OF LEFT KIDNEY, PERCUTANEOUS ENDOSCOPIC APPROACH: ICD-10-PCS | Performed by: UROLOGY

## 2025-03-26 PROCEDURE — G0378 HOSPITAL OBSERVATION PER HR: HCPCS

## 2025-03-26 PROCEDURE — 84100 ASSAY OF PHOSPHORUS: CPT

## 2025-03-26 PROCEDURE — 7100000000 HC PACU RECOVERY - FIRST 15 MIN: Performed by: UROLOGY

## 2025-03-26 PROCEDURE — 88307 TISSUE EXAM BY PATHOLOGIST: CPT

## 2025-03-26 PROCEDURE — 6360000002 HC RX W HCPCS

## 2025-03-26 PROCEDURE — 3600000009 HC SURGERY ROBOT BASE: Performed by: UROLOGY

## 2025-03-26 DEVICE — HEMOLOK L 6 CLIPS/CART
Type: IMPLANTABLE DEVICE | Status: FUNCTIONAL
Brand: WECK

## 2025-03-26 RX ORDER — MINERAL OIL AND WHITE PETROLATUM 150; 830 MG/G; MG/G
OINTMENT OPHTHALMIC PRN
Status: DISCONTINUED | OUTPATIENT
Start: 2025-03-26 | End: 2025-03-27 | Stop reason: HOSPADM

## 2025-03-26 RX ORDER — POLYETHYLENE GLYCOL 3350 17 G/17G
17 POWDER, FOR SOLUTION ORAL DAILY PRN
Status: DISCONTINUED | OUTPATIENT
Start: 2025-03-26 | End: 2025-03-27 | Stop reason: HOSPADM

## 2025-03-26 RX ORDER — SODIUM CHLORIDE, SODIUM LACTATE, POTASSIUM CHLORIDE, CALCIUM CHLORIDE 600; 310; 30; 20 MG/100ML; MG/100ML; MG/100ML; MG/100ML
INJECTION, SOLUTION INTRAVENOUS
Status: DISCONTINUED | OUTPATIENT
Start: 2025-03-26 | End: 2025-03-26 | Stop reason: SDUPTHER

## 2025-03-26 RX ORDER — ACETAMINOPHEN 325 MG/1
650 TABLET ORAL EVERY 6 HOURS
Status: DISCONTINUED | OUTPATIENT
Start: 2025-03-26 | End: 2025-03-27 | Stop reason: HOSPADM

## 2025-03-26 RX ORDER — DONEPEZIL HYDROCHLORIDE 10 MG/1
10 TABLET, FILM COATED ORAL NIGHTLY
Status: DISCONTINUED | OUTPATIENT
Start: 2025-03-27 | End: 2025-03-27 | Stop reason: HOSPADM

## 2025-03-26 RX ORDER — HYOSCYAMINE SULFATE 0.12 MG/1
0.12 TABLET SUBLINGUAL EVERY 6 HOURS PRN
Status: DISCONTINUED | OUTPATIENT
Start: 2025-03-26 | End: 2025-03-27 | Stop reason: HOSPADM

## 2025-03-26 RX ORDER — PROPOFOL 10 MG/ML
INJECTION, EMULSION INTRAVENOUS
Status: DISCONTINUED | OUTPATIENT
Start: 2025-03-26 | End: 2025-03-26 | Stop reason: SDUPTHER

## 2025-03-26 RX ORDER — FENTANYL CITRATE 50 UG/ML
INJECTION, SOLUTION INTRAMUSCULAR; INTRAVENOUS
Status: DISCONTINUED | OUTPATIENT
Start: 2025-03-26 | End: 2025-03-26 | Stop reason: SDUPTHER

## 2025-03-26 RX ORDER — GLUCAGON 1 MG/ML
1 KIT INJECTION PRN
Status: DISCONTINUED | OUTPATIENT
Start: 2025-03-26 | End: 2025-03-27 | Stop reason: HOSPADM

## 2025-03-26 RX ORDER — ONDANSETRON 2 MG/ML
4 INJECTION INTRAMUSCULAR; INTRAVENOUS EVERY 6 HOURS PRN
Status: DISCONTINUED | OUTPATIENT
Start: 2025-03-26 | End: 2025-03-27 | Stop reason: HOSPADM

## 2025-03-26 RX ORDER — SODIUM CHLORIDE 0.9 % (FLUSH) 0.9 %
5-40 SYRINGE (ML) INJECTION EVERY 12 HOURS SCHEDULED
Status: DISCONTINUED | OUTPATIENT
Start: 2025-03-26 | End: 2025-03-26 | Stop reason: HOSPADM

## 2025-03-26 RX ORDER — POTASSIUM CHLORIDE 7.45 MG/ML
10 INJECTION INTRAVENOUS PRN
Status: DISCONTINUED | OUTPATIENT
Start: 2025-03-26 | End: 2025-03-27 | Stop reason: HOSPADM

## 2025-03-26 RX ORDER — OXYCODONE HYDROCHLORIDE 5 MG/1
5 TABLET ORAL
Status: DISCONTINUED | OUTPATIENT
Start: 2025-03-26 | End: 2025-03-26 | Stop reason: HOSPADM

## 2025-03-26 RX ORDER — ONDANSETRON 4 MG/1
4 TABLET, ORALLY DISINTEGRATING ORAL EVERY 8 HOURS PRN
Status: DISCONTINUED | OUTPATIENT
Start: 2025-03-26 | End: 2025-03-27 | Stop reason: HOSPADM

## 2025-03-26 RX ORDER — OXYCODONE HYDROCHLORIDE 5 MG/1
5 TABLET ORAL EVERY 4 HOURS PRN
Status: DISCONTINUED | OUTPATIENT
Start: 2025-03-26 | End: 2025-03-27 | Stop reason: HOSPADM

## 2025-03-26 RX ORDER — SCOPOLAMINE 1 MG/3D
1 PATCH, EXTENDED RELEASE TRANSDERMAL
Status: DISCONTINUED | OUTPATIENT
Start: 2025-03-26 | End: 2025-03-27 | Stop reason: HOSPADM

## 2025-03-26 RX ORDER — MAGNESIUM SULFATE IN WATER 40 MG/ML
2000 INJECTION, SOLUTION INTRAVENOUS PRN
Status: DISCONTINUED | OUTPATIENT
Start: 2025-03-26 | End: 2025-03-27 | Stop reason: HOSPADM

## 2025-03-26 RX ORDER — DEXTROSE MONOHYDRATE 100 MG/ML
INJECTION, SOLUTION INTRAVENOUS CONTINUOUS PRN
Status: DISCONTINUED | OUTPATIENT
Start: 2025-03-26 | End: 2025-03-27 | Stop reason: HOSPADM

## 2025-03-26 RX ORDER — SODIUM CHLORIDE 0.9 % (FLUSH) 0.9 %
5-40 SYRINGE (ML) INJECTION EVERY 12 HOURS SCHEDULED
Status: DISCONTINUED | OUTPATIENT
Start: 2025-03-26 | End: 2025-03-27 | Stop reason: HOSPADM

## 2025-03-26 RX ORDER — DEXAMETHASONE SODIUM PHOSPHATE 10 MG/ML
INJECTION, SOLUTION INTRA-ARTICULAR; INTRALESIONAL; INTRAMUSCULAR; INTRAVENOUS; SOFT TISSUE
Status: DISCONTINUED | OUTPATIENT
Start: 2025-03-26 | End: 2025-03-26 | Stop reason: SDUPTHER

## 2025-03-26 RX ORDER — MIDAZOLAM HYDROCHLORIDE 2 MG/2ML
2 INJECTION, SOLUTION INTRAMUSCULAR; INTRAVENOUS ONCE
Status: DISCONTINUED | OUTPATIENT
Start: 2025-03-26 | End: 2025-03-26

## 2025-03-26 RX ORDER — NALOXONE HYDROCHLORIDE 0.4 MG/ML
INJECTION, SOLUTION INTRAMUSCULAR; INTRAVENOUS; SUBCUTANEOUS PRN
Status: DISCONTINUED | OUTPATIENT
Start: 2025-03-26 | End: 2025-03-26 | Stop reason: HOSPADM

## 2025-03-26 RX ORDER — FENTANYL CITRATE 50 UG/ML
100 INJECTION, SOLUTION INTRAMUSCULAR; INTRAVENOUS ONCE
Refills: 0 | Status: COMPLETED | OUTPATIENT
Start: 2025-03-26 | End: 2025-03-26

## 2025-03-26 RX ORDER — BUPIVACAINE HYDROCHLORIDE AND EPINEPHRINE 5; 5 MG/ML; UG/ML
INJECTION, SOLUTION PERINEURAL PRN
Status: DISCONTINUED | OUTPATIENT
Start: 2025-03-26 | End: 2025-03-26 | Stop reason: HOSPADM

## 2025-03-26 RX ORDER — METOPROLOL TARTRATE 25 MG/1
25 TABLET, FILM COATED ORAL 2 TIMES DAILY
Status: DISCONTINUED | OUTPATIENT
Start: 2025-03-26 | End: 2025-03-27 | Stop reason: HOSPADM

## 2025-03-26 RX ORDER — SODIUM CHLORIDE 0.9 % (FLUSH) 0.9 %
5-40 SYRINGE (ML) INJECTION PRN
Status: DISCONTINUED | OUTPATIENT
Start: 2025-03-26 | End: 2025-03-26 | Stop reason: HOSPADM

## 2025-03-26 RX ORDER — APREPITANT 40 MG/1
40 CAPSULE ORAL ONCE
Status: COMPLETED | OUTPATIENT
Start: 2025-03-26 | End: 2025-03-26

## 2025-03-26 RX ORDER — SODIUM CHLORIDE 9 MG/ML
INJECTION, SOLUTION INTRAVENOUS PRN
Status: DISCONTINUED | OUTPATIENT
Start: 2025-03-26 | End: 2025-03-27 | Stop reason: HOSPADM

## 2025-03-26 RX ORDER — SODIUM CHLORIDE, SODIUM LACTATE, POTASSIUM CHLORIDE, CALCIUM CHLORIDE 600; 310; 30; 20 MG/100ML; MG/100ML; MG/100ML; MG/100ML
INJECTION, SOLUTION INTRAVENOUS CONTINUOUS
Status: DISCONTINUED | OUTPATIENT
Start: 2025-03-26 | End: 2025-03-26 | Stop reason: HOSPADM

## 2025-03-26 RX ORDER — LIDOCAINE HYDROCHLORIDE 10 MG/ML
INJECTION, SOLUTION EPIDURAL; INFILTRATION; INTRACAUDAL; PERINEURAL
Status: DISCONTINUED | OUTPATIENT
Start: 2025-03-26 | End: 2025-03-26 | Stop reason: SDUPTHER

## 2025-03-26 RX ORDER — LABETALOL HYDROCHLORIDE 5 MG/ML
10 INJECTION, SOLUTION INTRAVENOUS
Status: DISCONTINUED | OUTPATIENT
Start: 2025-03-26 | End: 2025-03-26 | Stop reason: HOSPADM

## 2025-03-26 RX ORDER — OXYCODONE HYDROCHLORIDE 5 MG/1
10 TABLET ORAL EVERY 4 HOURS PRN
Status: DISCONTINUED | OUTPATIENT
Start: 2025-03-26 | End: 2025-03-27 | Stop reason: HOSPADM

## 2025-03-26 RX ORDER — IPRATROPIUM BROMIDE AND ALBUTEROL SULFATE 2.5; .5 MG/3ML; MG/3ML
1 SOLUTION RESPIRATORY (INHALATION)
Status: DISCONTINUED | OUTPATIENT
Start: 2025-03-26 | End: 2025-03-26 | Stop reason: HOSPADM

## 2025-03-26 RX ORDER — BUPIVACAINE HYDROCHLORIDE 2.5 MG/ML
INJECTION, SOLUTION EPIDURAL; INFILTRATION; INTRACAUDAL; PERINEURAL
Status: COMPLETED | OUTPATIENT
Start: 2025-03-26 | End: 2025-03-26

## 2025-03-26 RX ORDER — INSULIN LISPRO 100 [IU]/ML
0-8 INJECTION, SOLUTION INTRAVENOUS; SUBCUTANEOUS
Status: DISCONTINUED | OUTPATIENT
Start: 2025-03-26 | End: 2025-03-27 | Stop reason: HOSPADM

## 2025-03-26 RX ORDER — SODIUM CHLORIDE 9 MG/ML
INJECTION, SOLUTION INTRAVENOUS PRN
Status: DISCONTINUED | OUTPATIENT
Start: 2025-03-26 | End: 2025-03-26 | Stop reason: HOSPADM

## 2025-03-26 RX ORDER — EPHEDRINE SULFATE/0.9% NACL/PF 25 MG/5 ML
SYRINGE (ML) INTRAVENOUS
Status: DISCONTINUED | OUTPATIENT
Start: 2025-03-26 | End: 2025-03-26 | Stop reason: SDUPTHER

## 2025-03-26 RX ORDER — PHENYLEPHRINE HCL IN 0.9% NACL 1 MG/10 ML
SYRINGE (ML) INTRAVENOUS
Status: DISCONTINUED | OUTPATIENT
Start: 2025-03-26 | End: 2025-03-26 | Stop reason: SDUPTHER

## 2025-03-26 RX ORDER — HEPARIN SODIUM 5000 [USP'U]/ML
5000 INJECTION, SOLUTION INTRAVENOUS; SUBCUTANEOUS ONCE
Status: COMPLETED | OUTPATIENT
Start: 2025-03-26 | End: 2025-03-26

## 2025-03-26 RX ORDER — HYDRALAZINE HYDROCHLORIDE 20 MG/ML
10 INJECTION INTRAMUSCULAR; INTRAVENOUS
Status: DISCONTINUED | OUTPATIENT
Start: 2025-03-26 | End: 2025-03-26 | Stop reason: HOSPADM

## 2025-03-26 RX ORDER — ONDANSETRON 2 MG/ML
INJECTION INTRAMUSCULAR; INTRAVENOUS
Status: DISCONTINUED | OUTPATIENT
Start: 2025-03-26 | End: 2025-03-26 | Stop reason: SDUPTHER

## 2025-03-26 RX ORDER — ROCURONIUM BROMIDE 10 MG/ML
INJECTION, SOLUTION INTRAVENOUS
Status: DISCONTINUED | OUTPATIENT
Start: 2025-03-26 | End: 2025-03-26 | Stop reason: SDUPTHER

## 2025-03-26 RX ORDER — SODIUM CHLORIDE 9 MG/ML
INJECTION, SOLUTION INTRAVENOUS CONTINUOUS
Status: DISCONTINUED | OUTPATIENT
Start: 2025-03-26 | End: 2025-03-27 | Stop reason: HOSPADM

## 2025-03-26 RX ORDER — SODIUM CHLORIDE 0.9 % (FLUSH) 0.9 %
5-40 SYRINGE (ML) INJECTION PRN
Status: DISCONTINUED | OUTPATIENT
Start: 2025-03-26 | End: 2025-03-27 | Stop reason: HOSPADM

## 2025-03-26 RX ORDER — POTASSIUM CHLORIDE 1500 MG/1
40 TABLET, EXTENDED RELEASE ORAL PRN
Status: DISCONTINUED | OUTPATIENT
Start: 2025-03-26 | End: 2025-03-27 | Stop reason: HOSPADM

## 2025-03-26 RX ADMIN — HYDROMORPHONE HYDROCHLORIDE 0.5 MG: 1 INJECTION, SOLUTION INTRAMUSCULAR; INTRAVENOUS; SUBCUTANEOUS at 15:51

## 2025-03-26 RX ADMIN — SODIUM CHLORIDE, SODIUM LACTATE, POTASSIUM CHLORIDE, AND CALCIUM CHLORIDE: .6; .31; .03; .02 INJECTION, SOLUTION INTRAVENOUS at 10:44

## 2025-03-26 RX ADMIN — ONDANSETRON 4 MG: 2 INJECTION, SOLUTION INTRAMUSCULAR; INTRAVENOUS at 14:33

## 2025-03-26 RX ADMIN — DEXAMETHASONE SODIUM PHOSPHATE 4 MG: 10 INJECTION INTRAMUSCULAR; INTRAVENOUS at 12:29

## 2025-03-26 RX ADMIN — PROPOFOL 200 MG: 10 INJECTION, EMULSION INTRAVENOUS at 12:18

## 2025-03-26 RX ADMIN — Medication 80 MCG: at 14:31

## 2025-03-26 RX ADMIN — SUGAMMADEX 200 MG: 100 INJECTION, SOLUTION INTRAVENOUS at 15:03

## 2025-03-26 RX ADMIN — APREPITANT 40 MG: 40 CAPSULE ORAL at 10:39

## 2025-03-26 RX ADMIN — SUGAMMADEX 100 MG: 100 INJECTION, SOLUTION INTRAVENOUS at 14:53

## 2025-03-26 RX ADMIN — ACETAMINOPHEN 650 MG: 325 TABLET ORAL at 19:30

## 2025-03-26 RX ADMIN — Medication 3000 MG: at 12:45

## 2025-03-26 RX ADMIN — BUPIVACAINE HYDROCHLORIDE 15 ML: 2.5 INJECTION, SOLUTION EPIDURAL; INFILTRATION; INTRACAUDAL; PERINEURAL at 10:50

## 2025-03-26 RX ADMIN — HEPARIN SODIUM 5000 UNITS: 5000 INJECTION INTRAVENOUS; SUBCUTANEOUS at 10:41

## 2025-03-26 RX ADMIN — EPHEDRINE SULFATE 5 MG: 5 INJECTION INTRAVENOUS at 12:40

## 2025-03-26 RX ADMIN — ROCURONIUM BROMIDE 10 MG: 10 INJECTION, SOLUTION INTRAVENOUS at 14:31

## 2025-03-26 RX ADMIN — Medication 80 MCG: at 12:36

## 2025-03-26 RX ADMIN — FENTANYL CITRATE 100 MCG: 50 INJECTION, SOLUTION INTRAMUSCULAR; INTRAVENOUS at 12:18

## 2025-03-26 RX ADMIN — EPHEDRINE SULFATE 5 MG: 5 INJECTION INTRAVENOUS at 12:42

## 2025-03-26 RX ADMIN — METOPROLOL TARTRATE 25 MG: 25 TABLET, FILM COATED ORAL at 20:55

## 2025-03-26 RX ADMIN — HYDROMORPHONE HYDROCHLORIDE 0.5 MG: 1 INJECTION, SOLUTION INTRAMUSCULAR; INTRAVENOUS; SUBCUTANEOUS at 16:01

## 2025-03-26 RX ADMIN — BUPIVACAINE 20 ML: 13.3 INJECTION, SUSPENSION, LIPOSOMAL INFILTRATION at 10:50

## 2025-03-26 RX ADMIN — Medication 40 MCG: at 14:45

## 2025-03-26 RX ADMIN — Medication 80 MCG: at 12:43

## 2025-03-26 RX ADMIN — SODIUM CHLORIDE: 0.9 INJECTION, SOLUTION INTRAVENOUS at 18:59

## 2025-03-26 RX ADMIN — Medication 2000 MG: at 20:55

## 2025-03-26 RX ADMIN — OXYCODONE HYDROCHLORIDE 5 MG: 5 TABLET ORAL at 20:55

## 2025-03-26 RX ADMIN — ROCURONIUM BROMIDE 20 MG: 10 INJECTION, SOLUTION INTRAVENOUS at 13:55

## 2025-03-26 RX ADMIN — LIDOCAINE HYDROCHLORIDE 50 MG: 10 INJECTION, SOLUTION EPIDURAL; INFILTRATION; INTRACAUDAL; PERINEURAL at 12:18

## 2025-03-26 RX ADMIN — ROCURONIUM BROMIDE 20 MG: 10 INJECTION, SOLUTION INTRAVENOUS at 12:48

## 2025-03-26 RX ADMIN — SODIUM CHLORIDE, POTASSIUM CHLORIDE, SODIUM LACTATE AND CALCIUM CHLORIDE: 600; 310; 30; 20 INJECTION, SOLUTION INTRAVENOUS at 12:16

## 2025-03-26 RX ADMIN — ROCURONIUM BROMIDE 50 MG: 10 INJECTION, SOLUTION INTRAVENOUS at 12:18

## 2025-03-26 RX ADMIN — FENTANYL CITRATE 50 MCG: 50 INJECTION INTRAMUSCULAR; INTRAVENOUS at 10:49

## 2025-03-26 RX ADMIN — EPHEDRINE SULFATE 5 MG: 5 INJECTION INTRAVENOUS at 13:19

## 2025-03-26 RX ADMIN — SODIUM CHLORIDE, SODIUM LACTATE, POTASSIUM CHLORIDE, CALCIUM CHLORIDE: 600; 310; 30; 20 INJECTION, SOLUTION INTRAVENOUS at 12:23

## 2025-03-26 RX ADMIN — Medication 40 MCG: at 14:43

## 2025-03-26 RX ADMIN — Medication 40 MCG: at 12:34

## 2025-03-26 RX ADMIN — EPHEDRINE SULFATE 10 MG: 5 INJECTION INTRAVENOUS at 12:53

## 2025-03-26 ASSESSMENT — PAIN SCALES - GENERAL
PAINLEVEL_OUTOF10: 5
PAINLEVEL_OUTOF10: 3
PAINLEVEL_OUTOF10: 5
PAINLEVEL_OUTOF10: 8
PAINLEVEL_OUTOF10: 8
PAINLEVEL_OUTOF10: 6

## 2025-03-26 ASSESSMENT — PAIN DESCRIPTION - LOCATION
LOCATION: ABDOMEN
LOCATION: ABDOMEN;BACK

## 2025-03-26 ASSESSMENT — PAIN DESCRIPTION - ORIENTATION
ORIENTATION: LEFT;UPPER
ORIENTATION: LEFT
ORIENTATION: LEFT;UPPER
ORIENTATION: LEFT

## 2025-03-26 ASSESSMENT — PAIN DESCRIPTION - DESCRIPTORS: DESCRIPTORS: ACHING

## 2025-03-26 ASSESSMENT — PAIN - FUNCTIONAL ASSESSMENT: PAIN_FUNCTIONAL_ASSESSMENT: 0-10

## 2025-03-26 NOTE — ANESTHESIA POSTPROCEDURE EVALUATION
Department of Anesthesiology  Postprocedure Note    Patient: Irwin Lr  MRN: [id number]  YOB: 1945  Date of evaluation: 3/26/2025    Procedure Summary       Date: 03/26/25 Room / Location: [address]    Anesthesia Start: 1213 Anesthesia Stop: 1519    Procedure: ROBOTIC LAPAROSCOPIC NEPHROURETERECTOMY (GEMZAR ORDERED) (Left) Diagnosis:       Urothelial cancer (HCC)      (Urothelial cancer (HCC) [C68.9])    Surgeons: Frankie Lala MD Responsible Provider: Jose Solorzano MD    Anesthesia Type: general ASA Status: 3            Anesthesia Type: No value filed.    [place] Phase I: Florida Score: 9    [place] Phase II:      Anesthesia Post Evaluation    Patient location during evaluation: bedside  Patient participation: complete - patient participated  Level of consciousness: awake  Airway patency: patent  Nausea & Vomiting: no nausea and no vomiting  Cardiovascular status: blood pressure returned to baseline  Respiratory status: acceptable  Hydration status: euvolemic  Comments: BP (!) 163/81   Pulse 75   Temp 97.7 °F (36.5 °C) (Oral)   Resp 16   Ht 1.791 m (5' 10.5\")   Wt 119.5 kg (263 lb 7.2 oz)   SpO2 95%   BMI 37.27 kg/m²     Pain management: adequate    No notable events documented.

## 2025-03-26 NOTE — ANESTHESIA PRE PROCEDURE
Department of Anesthesiology  Preprocedure Note       Name:  Irwin Lr   Age:  79 y.o.  :  1945                                          MRN:  [id number]         Date:  3/26/2025      Surgeon: Surgeon(s):  Frankie Lala MD    Procedure: Procedure(s):  ROBOTIC LAPAROSCOPIC NEPHROURETERECTOMY (GEMZAR ORDERED)    Medications prior to admission:   Prior to Admission medications    Medication Sig Start Date End Date Taking? Authorizing Provider   erythromycin (ROMYCIN) 5 MG/GM ophthalmic ointment Place into both eyes nightly    Heidy Cervantes MD   carboxymethylcellulose 1 % ophthalmic solution Place 1 drop into both eyes 3 times daily    Heidy Cervantes MD   Loratadine-Pseudoephedrine (CLARITIN-D 24 HOUR PO) 1 tabs, Oral, Daily, PRN, 0 Refill(s) 10/3/24   Heidy Cervantes MD   Zinc Sulfate 140 (50 Zn) MG TABS daily 24   Heidy Cervantes MD   vitamin D 50 MCG (2000 UT) CAPS capsule Take 1,000 Units by mouth daily    Heidy Cervantes MD   Ascorbic Acid (VITAMIN C) 1000 MG tablet Take 1 tablet by mouth daily 24   Heidy Cervantes MD   apixaban (ELIQUIS) 5 MG TABS tablet Take 1 tablet by mouth 2 times daily Pt. to stop 3 days pre-op for OR 3-26-25. 24   Heidy Cervantes MD   gabapentin (NEURONTIN) 300 MG capsule Take 1 capsule by mouth. 10/3/24   Heidy Cervantes MD   donepezil (ARICEPT) 10 MG tablet Take 1 tablet by mouth nightly 10/3/24   Heidy Cervantes MD   glipiZIDE (GLUCOTROL) 5 MG tablet Take 1 tablet by mouth 2 times daily (before meals) 24   Heidy Cervantes MD   metoprolol tartrate (LOPRESSOR) 25 MG tablet Take 1 tablet by mouth 2 times daily 24   Heidy Cervantes MD   latanoprost (XALATAN) 0.005 % ophthalmic solution 1 drop nightly 10/3/24   Heidy Cervantes MD   fluticasone (FLONASE) 50 MCG/ACT nasal spray by Nasal route daily as needed 10/3/24   Heidy Cervantes MD   dorzolamide-timolol (COSOPT) 2-0.5 %

## 2025-03-26 NOTE — ANESTHESIA PROCEDURE NOTES
Peripheral Block    Patient location during procedure: pre-op  Reason for block: procedure for pain, post-op pain management and at surgeon's request  Start time: 3/26/2025 10:50 AM  End time: 3/26/2025 10:52 AM  Staffing  Performed: anesthesiologist   Anesthesiologist: Jose Solorzano MD  Performed by: Jose Solorzano MD  Authorized by: Jose Solorzano MD    Preanesthetic Checklist  Completed: patient identified, IV checked, site marked, risks and benefits discussed, surgical/procedural consents, equipment checked, pre-op evaluation, timeout performed, anesthesia consent given, oxygen available, monitors applied/VS acknowledged, fire risk safety assessment completed and verbalized and blood product R/B/A discussed and consented  Peripheral Block   Patient position: supine  Prep: ChloraPrep  Provider prep: mask and sterile gloves  Patient monitoring: cardiac monitor, continuous pulse ox, frequent blood pressure checks, IV access, oxygen and responsive to questions  Block type: Erector spinae  Laterality: left  Injection technique: single-shot  Guidance: ultrasound guided    Needle   Needle type: insulated echogenic nerve stimulator needle   Needle gauge: 22 G  Needle localization: ultrasound guidance  Needle length: 11 cm  Assessment   Injection assessment: negative aspiration for heme, no paresthesia on injection and local visualized surrounding nerve on ultrasound  Outcomes: patient tolerated procedure well and uncomplicated    Medications Administered  BUPivacaine (MARCAINE) PF injection 0.25% - Perineural   15 mL - 3/26/2025 10:50:00 AM  BUPivacaine liposome (EXPAREL) injection 1.3% - Perineural   20 mL - 3/26/2025 10:50:00 AM

## 2025-03-26 NOTE — ANESTHESIA PROCEDURE NOTES
Airway  Date/Time: 3/26/2025 12:22 PM  Urgency: elective      General Information and Staff    Patient location during procedure: OR  Anesthesiologist: Jose Solorzano MD  Resident/CRNA: Gold Lopez APRN - CRNA  Performed: anesthesiologist and resident/CRNA/CAA   Performed by: Gold Lopez APRN - CRNA  Authorized by: Jose Solorzano MD      Indications and Patient Condition  Indications for airway management: anesthesia and airway protection  Spontaneous Ventilation: absent  Sedation level: deep  Preoxygenated: yes  Patient position: sniffing  Mask difficulty assessment: vent by bag mask + OA or adjuvant +/- NMBA    Final Airway Details  Final airway type: endotracheal airway      Successful airway: ETT     Facilitating devices/methods: intubating stylet  Endotracheal tube insertion site: oral  Blade: Mccann  Blade size: #3  ETT size (mm): 8.0  Placement verified by: chest auscultation and capnometry   Measured from: lips  ETT to lips (cm): 23  Number of attempts at approach: 2

## 2025-03-26 NOTE — OP NOTE
Operative Note      Patient: Irwin Lr  YOB: 1945  MRN: [id number]    Date of Procedure: 3/26/2025    Pre-Op Diagnosis Codes:      * Urothelial cancer (HCC) [C68.9]    Post-Op Diagnosis: Same       Procedure(s):  Robotic left nephroureterectomy  Intravesical instillation of 2g gemcitabine    Surgeon(s):  Frankie Lala MD    Assistant:   First Assistant: Rakel Ann RN  Resident: Reginaldo Ritter MD    Anesthesia: General    Estimated Blood Loss (mL): 20 mL    Complications: None    Specimens:   ID Type Source Tests Collected by Time Destination   1 : URINE FROM INITIAL JACKSON INSERTION Urine Urine, indwelling catheter CULTURE, URINE (Canceled), URINALYSIS (Canceled) Frankie Lala MD 3/26/2025 1312    A : LEFT KIDNEY, URETER Tissue Kidney SURGICAL PATHOLOGY Frankie Lala MD 3/26/2025 1426    B : PERITONEAL LESIONS Tissue Peritoneum SURGICAL PATHOLOGY Frankei Lala MD 3/26/2025 1435        Implants:  Implant Name Type Inv. Item Serial No.  Lot No. LRB No. Used Action   CLIP INT L POLYMER DONNA LIG HEM O DONNA (6EA/PK) - [id number]  CLIP INT L POLYMER DONNA LIG HEM O DONNA (6EA/PK)  [facility] [id number] Left 1 Implanted   CLIP INT L POLYMER DONNA LIG HEM O DONNA (6EA/PK) - [id number]  CLIP INT L POLYMER DONNA LIG HEM O DONNA (6EA/PK)  [facility] [id number] Left 1 Implanted   CLIP INT L POLYMER DONNA LIG HEM O DONNA (6EA/PK) - [id number]  CLIP INT L POLYMER DONNA LIG HEM O DONNA (6EA/PK)  [facility] [id number] Left 1 Implanted   CLIP INT L POLYMER DONNA LIG HEM O DONNA (6EA/PK) - [id number]  CLIP INT L POLYMER DONNA LIG HEM O DONNA (6EA/PK)  [facility] [id number] Left 1 Implanted         Drains:   Urinary Catheter 03/26/25 (Active)   Catheter Indications Perioperative use for selected surgical procedures 03/26/25 1654   Site Assessment Urethral drainage 03/26/25 1654   Urine Color Yellow 03/26/25 1654   Urine Appearance Hazy 03/26/25 1654   Collection Container Standard

## 2025-03-26 NOTE — INTERVAL H&P NOTE
Update History & Physical    The patient's History and Physical of March 10, 2025 was reviewed with the patient and I examined the patient. There was no change. The surgical site was confirmed by the patient and me.     Plan: Left Nephro ureterectomy  The risks, benefits, expected outcome, and alternative to the recommended procedure have been discussed with the patient. Patient understands and wants to proceed with the procedure.     Electronically signed by Belinda Montano PA-C on 3/26/2025 at 9:21 AM

## 2025-03-27 VITALS
HEART RATE: 73 BPM | SYSTOLIC BLOOD PRESSURE: 139 MMHG | WEIGHT: 263.45 LBS | OXYGEN SATURATION: 94 % | DIASTOLIC BLOOD PRESSURE: 78 MMHG | TEMPERATURE: 98.1 F | RESPIRATION RATE: 16 BRPM | BODY MASS INDEX: 36.88 KG/M2 | HEIGHT: 71 IN

## 2025-03-27 LAB
ANION GAP SERPL CALCULATED.3IONS-SCNC: 12 MMOL/L (ref 9–16)
BUN SERPL-MCNC: 23 MG/DL (ref 8–23)
CALCIUM SERPL-MCNC: 9 MG/DL (ref 8.6–10.4)
CHLORIDE SERPL-SCNC: 102 MMOL/L (ref 98–107)
CO2 SERPL-SCNC: 22 MMOL/L (ref 20–31)
CREAT SERPL-MCNC: 1.2 MG/DL (ref 0.7–1.2)
ERYTHROCYTE [DISTWIDTH] IN BLOOD BY AUTOMATED COUNT: 13.3 % (ref 11.8–14.4)
GFR, ESTIMATED: 61 ML/MIN/1.73M2
GLUCOSE BLD-MCNC: 138 MG/DL (ref 75–110)
GLUCOSE BLD-MCNC: 162 MG/DL (ref 75–110)
GLUCOSE SERPL-MCNC: 133 MG/DL (ref 74–99)
HCT VFR BLD AUTO: 41 % (ref 40.7–50.3)
HGB BLD-MCNC: 13.2 G/DL (ref 13–17)
MCH RBC QN AUTO: 31.7 PG (ref 25.2–33.5)
MCHC RBC AUTO-ENTMCNC: 32.2 G/DL (ref 28.4–34.8)
MCV RBC AUTO: 98.6 FL (ref 82.6–102.9)
NRBC BLD-RTO: 0 PER 100 WBC
PLATELET # BLD AUTO: 215 K/UL (ref 138–453)
PMV BLD AUTO: 9.2 FL (ref 8.1–13.5)
POTASSIUM SERPL-SCNC: 4.3 MMOL/L (ref 3.7–5.3)
RBC # BLD AUTO: 4.16 M/UL (ref 4.21–5.77)
SODIUM SERPL-SCNC: 136 MMOL/L (ref 136–145)
WBC OTHER # BLD: 11.8 K/UL (ref 3.5–11.3)

## 2025-03-27 PROCEDURE — 2500000003 HC RX 250 WO HCPCS: Performed by: STUDENT IN AN ORGANIZED HEALTH CARE EDUCATION/TRAINING PROGRAM

## 2025-03-27 PROCEDURE — 82947 ASSAY GLUCOSE BLOOD QUANT: CPT

## 2025-03-27 PROCEDURE — 6370000000 HC RX 637 (ALT 250 FOR IP): Performed by: STUDENT IN AN ORGANIZED HEALTH CARE EDUCATION/TRAINING PROGRAM

## 2025-03-27 PROCEDURE — 6360000002 HC RX W HCPCS: Performed by: STUDENT IN AN ORGANIZED HEALTH CARE EDUCATION/TRAINING PROGRAM

## 2025-03-27 PROCEDURE — 80048 BASIC METABOLIC PNL TOTAL CA: CPT

## 2025-03-27 PROCEDURE — 36415 COLL VENOUS BLD VENIPUNCTURE: CPT

## 2025-03-27 PROCEDURE — 85027 COMPLETE CBC AUTOMATED: CPT

## 2025-03-27 PROCEDURE — 1200000000 HC SEMI PRIVATE

## 2025-03-27 PROCEDURE — G0378 HOSPITAL OBSERVATION PER HR: HCPCS

## 2025-03-27 RX ORDER — METHOCARBAMOL 750 MG/1
750 TABLET, FILM COATED ORAL 3 TIMES DAILY
Qty: 30 TABLET | Refills: 0 | Status: SHIPPED | OUTPATIENT
Start: 2025-03-27 | End: 2025-04-06

## 2025-03-27 RX ORDER — POLYETHYLENE GLYCOL 3350 17 G/17G
17 POWDER, FOR SOLUTION ORAL DAILY
Qty: 30 EACH | Refills: 0 | Status: SHIPPED | OUTPATIENT
Start: 2025-03-27 | End: 2025-03-27

## 2025-03-27 RX ORDER — OXYCODONE HYDROCHLORIDE 5 MG/1
5 TABLET ORAL EVERY 8 HOURS PRN
Qty: 15 TABLET | Refills: 0 | Status: SHIPPED | OUTPATIENT
Start: 2025-03-27 | End: 2025-04-01

## 2025-03-27 RX ORDER — HYOSCYAMINE SULFATE 0.12 MG/1
0.12 TABLET SUBLINGUAL EVERY 6 HOURS PRN
Qty: 28 TABLET | Refills: 0 | Status: SHIPPED | OUTPATIENT
Start: 2025-03-27 | End: 2025-04-03

## 2025-03-27 RX ORDER — POLYETHYLENE GLYCOL 3350 17 G/17G
17 POWDER, FOR SOLUTION ORAL DAILY
Qty: 30 EACH | Refills: 0 | Status: SHIPPED | OUTPATIENT
Start: 2025-03-27 | End: 2025-04-26

## 2025-03-27 RX ORDER — DOCUSATE SODIUM 100 MG/1
100 CAPSULE, LIQUID FILLED ORAL 2 TIMES DAILY
Qty: 60 CAPSULE | Refills: 0 | Status: SHIPPED | OUTPATIENT
Start: 2025-03-27 | End: 2025-04-26

## 2025-03-27 RX ORDER — SULFAMETHOXAZOLE AND TRIMETHOPRIM 800; 160 MG/1; MG/1
1 TABLET ORAL 2 TIMES DAILY
Qty: 6 TABLET | Refills: 0 | Status: SHIPPED | OUTPATIENT
Start: 2025-03-27 | End: 2025-03-30

## 2025-03-27 RX ADMIN — SODIUM CHLORIDE, PRESERVATIVE FREE 10 ML: 5 INJECTION INTRAVENOUS at 08:16

## 2025-03-27 RX ADMIN — Medication 2000 MG: at 11:52

## 2025-03-27 RX ADMIN — METOPROLOL TARTRATE 25 MG: 25 TABLET, FILM COATED ORAL at 08:16

## 2025-03-27 RX ADMIN — SODIUM CHLORIDE, PRESERVATIVE FREE 10 ML: 5 INJECTION INTRAVENOUS at 11:52

## 2025-03-27 RX ADMIN — POLYETHYLENE GLYCOL 3350 17 G: 17 POWDER, FOR SOLUTION ORAL at 11:16

## 2025-03-27 RX ADMIN — ACETAMINOPHEN 650 MG: 325 TABLET ORAL at 00:14

## 2025-03-27 RX ADMIN — ACETAMINOPHEN 650 MG: 325 TABLET ORAL at 11:16

## 2025-03-27 RX ADMIN — ACETAMINOPHEN 650 MG: 325 TABLET ORAL at 06:12

## 2025-03-27 RX ADMIN — Medication 2000 MG: at 04:07

## 2025-03-27 ASSESSMENT — PAIN DESCRIPTION - ORIENTATION: ORIENTATION: LEFT

## 2025-03-27 ASSESSMENT — PAIN DESCRIPTION - DESCRIPTORS: DESCRIPTORS: ACHING

## 2025-03-27 ASSESSMENT — PAIN SCALES - GENERAL: PAINLEVEL_OUTOF10: 3

## 2025-03-27 ASSESSMENT — PAIN DESCRIPTION - LOCATION: LOCATION: ABDOMEN

## 2025-03-27 ASSESSMENT — PAIN - FUNCTIONAL ASSESSMENT: PAIN_FUNCTIONAL_ASSESSMENT: ACTIVITIES ARE NOT PREVENTED

## 2025-03-27 NOTE — DISCHARGE INSTR - COC
Continuity of Care Form    Patient Name: Irwin Lr   :  1945  MRN:  [id number]    Admit date:  3/26/2025  Discharge date:  ***    Code Status Order: Full Code   Advance Directives:    Date/Time Healthcare Directive Type of Healthcare Directive Copy in Chart Healthcare Agent Appointed Healthcare Agent's Name Healthcare Agent's Phone Number    25 0933 No, patient does not have an advance directive for healthcare treatment  --  --  --  --  --             Admitting Physician:  Frankie Lala MD  PCP: Washington Clement MD    Discharging Nurse: ***  Discharging Hospital Unit/Room#: 0317/0317-01  Discharging Unit Phone Number: ***    Emergency Contact:   Extended Emergency Contact Information  Primary Emergency Contact: Irwin Lr  Home Phone: [phone]  Mobile Phone: [phone]  Relation: Child  Secondary Emergency Contact: Diana Lr  Address: [address] [place] [address]  Home Phone: [phone]  Relation: Spouse    Past Surgical History:  Past Surgical History:   Procedure Laterality Date    BLADDER TUMOR EXCISION  2016    TUR    COLONOSCOPY      CYSTOSCOPY      CYSTOSCOPY  2016    with TUR-BT fulgerration    CYSTOSCOPY Left 2024    LEFT URETEROSCOPY,  LEFT STENT EXCHANGE (Left: Bladder)    CYSTOSCOPY Left 2024    CYSTOSCOPY LEFT URETEROSCOPY,  LEFT STENT EXCHANGE performed by Frankie Lala MD at [facility] OR    CYSTOURETHROSCOPY/URETHRAL DILATION Right 2016    stent exchange    HAND TENDON SURGERY Left     HERNIA REPAIR  2009    umbilical    LAPAROSCOPIC NEPHROURETERECTOMY, HAND ASSISTED Left 2025       Immunization History:   Immunization History   Administered Date(s) Administered    COVID-19, PFIZER Bivalent, DO NOT Dilute, (age 12y+), IM, 30 mcg/0.3 mL 2022    COVID-19, PFIZER PURPLE top, DILUTE for use, (age 12 y+), 30mcg/0.3mL 2021, 2021, 2021       Active Problems:  Patient Active Problem List  example:573980171}  Last Modified Barium Swallow with Video (Video Swallowing Test): {Done Not Done Date:}    Treatments at the Time of Hospital Discharge:   Respiratory Treatments: ***  Oxygen Therapy:  {Therapy; copd oxygen:59516}  Ventilator:    { CC Vent List:188746043}    Rehab Therapies: {THERAPEUTIC INTERVENTION:4846356445}  Weight Bearing Status/Restrictions: {[facility] Weight Bearin}  Other Medical Equipment (for information only, NOT a DME order):  {EQUIPMENT:951632499}  Other Treatments: ***    Patient's personal belongings (please select all that are sent with patient):  {[facility] DME Belongings:892760134}    RN SIGNATURE:  {Esignature:812328047}    CASE MANAGEMENT/SOCIAL WORK SECTION    Inpatient Status Date: ***    Readmission Risk Assessment Score:  [facility] RISK OF UNPLANNED READMISSION 2.0             9.4 Total Score        Discharging to Facility/ Agency   Name:   Address:  Phone:  Fax:    Dialysis Facility (if applicable)   Name:  Address:  Dialysis Schedule:  Phone:  Fax:    / signature: {Esignature:747851116}    PHYSICIAN SECTION    Prognosis: {Prognosis:4427847894}    Condition at Discharge: { Patient Condition:442480388}    Rehab Potential (if transferring to Rehab): {Prognosis:2644188137}    Recommended Labs or Other Treatments After Discharge: ***    Physician Certification: I certify the above information and transfer of Irwin Lr  is necessary for the continuing treatment of the diagnosis listed and that he requires {Admit to Appropriate Level of Care:53805} for {GREATER/LESS:701151302} 30 days.     Update Admission H&P: {[facility] DME Changes in HandP:125095103}    PHYSICIAN SIGNATURE:  {Esignature:912372971}

## 2025-03-27 NOTE — PROGRESS NOTES
Urology Progress Note      Subjective: Irwin Lr is a 79 y.o. male.  His/Her current Diet is: ADULT DIET; Regular.    Since the previous note, the patient reports the following:  No acute issues overnight.  No fevers or chills.  No nausea or vomiting.  No chest pain or shortness of breath.  Ambulating  Tolerating PO diet    WBC 11.8 (12.2)  Hgb 13.2 (14.4)  Cr 1.2 (0.9)    Wright UOP 1050 mL/24h yellow    Vitals and Labs:  Vitals:    03/26/25 2125 03/27/25 0031 03/27/25 0400 03/27/25 0703   BP:  (!) 150/81 138/77 139/78   Pulse:  86 83 73   Resp: 16 16 16    Temp:  99.1 °F (37.3 °C) 98.4 °F (36.9 °C) 98.1 °F (36.7 °C)   TempSrc:  Oral Oral    SpO2:  95% 92% 94%   Weight:       Height:         I/O last 3 completed shifts:  In: 2515.4 [I.V.:2515.4]  Out: 1050 [Urine:1050]    Recent Labs     03/26/25 2050 03/27/25  0558   WBC 12.2* 11.8*   HGB 14.4 13.2   HCT 44.6 41.0   MCV 97.6 98.6    215     Recent Labs     03/26/25 2050      K 4.6      CO2 21   PHOS 4.2   BUN 21   CREATININE 1.2       No results for input(s): \"COLORU\", \"PHUR\", \"LABCAST\", \"WBCUA\", \"RBCUA\", \"MUCUS\", \"TRICHOMONAS\", \"YEAST\", \"BACTERIA\", \"CLARITYU\", \"SPECGRAV\", \"LEUKOCYTESUR\", \"UROBILINOGEN\", \"BILIRUBINUR\", \"BLOODU\" in the last 72 hours.    Invalid input(s): \"NITRATE\", \"GLUCOSEUKETONESUAMORPHOUS\"    Physical Exam:  NAD  A/O x 3  RRR  No accessory muscles of inspiration  Abdomen soft, non-tender, non-distended. No CVA tenderness.  Wright in place. Clear yellow UOP.  No calf pain.  EPCs on. Machine turned on.    Impression: 78 yo male with  Left upper tract urothelial carcinoma s/p left nephroureterectomy 3/27/2025    Plan:  Regular diet  Maintain wright catheter for 1 week after surgery  Pain control and antiemetics PRN  Bowel regimen  Incentive spirometry  Encourage ambulation  Discharge today    Reginaldo Ritter MD  7:36 AM 3/27/2025

## 2025-03-27 NOTE — DISCHARGE SUMMARY
DISCHARGE SUMMARY NOTE:        Patient Identification  PATIENT: Irwin Lr is a 79 y.o. male.  MRN: [id number]  :  1945  Admit Date:  3/26/2025  Discharge date:   25                                  Disposition: home  Discharged Condition:  good  Discharge Diagnoses:     Patient Active Problem List   Diagnosis    Bladder mass    Malignant neoplasm of overlapping sites of bladder (HCC)    Urothelial cancer (HCC)    Urothelial carcinoma of kidney, left (HCC)       Consults: none    Surgery: ROBOTIC LAPAROSCOPIC NEPHROURETERECTOMY (GEMZAR ORDERED)     Patient Instructions:   Activity: Per discharge instructions  Diet: As tolerated  Patient told to follow up with Frankie Lala MD in 1 week(s).   Discharge Medications:      Medication List        PAUSE taking these medications      Eliquis 5 MG Tabs tablet  Wait to take this until: April 3, 2025  Generic drug: apixaban            START taking these medications      docusate sodium 100 MG capsule  Commonly known as: COLACE  Take 1 capsule by mouth 2 times daily     hyoscyamine 0.125 MG sublingual tablet  Commonly known as: Levsin/SL  Place 1 tablet under the tongue every 6 hours as needed for Cramping     methocarbamol 750 MG tablet  Commonly known as: Robaxin-750  Take 1 tablet by mouth 3 times daily for 10 days     oxyCODONE 5 MG immediate release tablet  Commonly known as: Roxicodone  Take 1 tablet by mouth every 8 hours as needed for Pain for up to 5 days. Intended supply: 3 days. Take lowest dose possible to manage pain Max Daily Amount: 15 mg     polyethylene glycol 17 GM/SCOOP powder  Commonly known as: GLYCOLAX  Take 17 g by mouth daily     sulfamethoxazole-trimethoprim 800-160 MG per tablet  Commonly known as: Bactrim DS  Take 1 tablet by mouth 2 times daily for 3 days            CONTINUE taking these medications      acetaminophen 500 MG tablet  Commonly known as: TYLENOL     carboxymethylcellulose 1 % ophthalmic solution     CLARITIN-D 24 HOUR  tolerated diet, and was ambulating appropriately.  Hemoglobin was stable on discharge at 11.8 from 12.2.  Mccoy catheter to stay in for 1 week, has antibiotic prescription to take starting on the day prior to catheter removal appointment. The patient was afebrile for 24 hrs before discharge. The patient agrees to discharge, understands discharge instructions, and agrees to follow up in 1 week.     Belinda Montano PA-C   8:37 AM 3/27/2025

## 2025-03-27 NOTE — DISCHARGE INSTRUCTIONS
You have undergone left nephro ureterectomy:  Please hold anticoagulation for 1 week upon discharge  Please no lifting more than a gallon of milk for the next 4 to 6 weeks  Please no tub bathing, swimming, or submerging in water for the next 4 to 6 weeks  Please take Colace/MiraLAX daily for the next month to avoid constipation  Please take Robaxin/oxycodone as needed every 8 hours for the next 5 to 7 days  Please take Levsin as needed every 6 hours for bladder spasms while the Mccoy catheter is in place  Please take Bactrim (antibiotic) the day prior to Mccoy catheter removal.  Continue taking for a total of 3 days.  Please follow-up in 1 week for Mccoy catheter removal and void trial.  Please call office with any questions or concerns.    Please immediately go to the ED if you begin spiking fevers greater than 102, increased drainage/discharge/redness of surgical sites concerning for infection, Mccoy catheter stops draining and you begin having severe lower abdominal pain.

## 2025-03-27 NOTE — DISCHARGE INSTR - DIET

## 2025-03-27 NOTE — PROGRESS NOTES
Discharge instruction given and explained to patient and son, with all questions answered.    Mccoy catheter care teaching and return demonstration instructed to patient, ensure patient understand.

## 2025-03-27 NOTE — CARE COORDINATION
Case Management Assessment  Initial Evaluation    Date/Time of Evaluation: 3/27/2025 9:57 AM  Assessment Completed by: Ivania Echols RN    If patient is discharged prior to next notation, then this note serves as note for discharge by case management.    Patient Name: Irwin Lr                   YOB: 1945  Diagnosis: Urothelial cancer (HCC) [C68.9]  Urothelial carcinoma of kidney, left [C64.2]                   Date / Time: 3/26/2025  9:00 AM    Patient Admission Status: Inpatient   Readmission Risk (Low < 19, Mod (19-27), High > 27): Readmission Risk Score: 9.4    Current PCP: Washington Clement MD  PCP verified by CM? Yes    Chart Reviewed: Yes      History Provided by: Patient  Patient Orientation: Alert and Oriented    Patient Cognition: Alert    Hospitalization in the last 30 days (Readmission):  No    If yes, Readmission Assessment in CM Navigator will be completed.    Advance Directives:      Code Status: Full Code   Patient's Primary Decision Maker is: Patient Declined (Legal Next of Kin Remains as Decision Maker)      Discharge Planning:    Patient lives with: Spouse/Significant Other Type of Home: House  Primary Care Giver: Self  Patient Support Systems include: Spouse/Significant Other   Current Financial resources: Medicare  Current community resources: None  Current services prior to admission: None            Current DME:              Type of Home Care services:  None    ADLS  Prior functional level: Independent in ADLs/IADLs  Current functional level: Independent in ADLs/IADLs    PT AM-PAC:   /24  OT AM-PAC:   /24    Family can provide assistance at DC: Yes  Would you like Case Management to discuss the discharge plan with any other family members/significant others, and if so, who? No  Plans to Return to Present Housing: Yes  Other Identified Issues/Barriers to RETURNING to current housing: none  Potential Assistance needed at discharge: N/A            Potential DME:    Patient

## 2025-03-27 NOTE — PLAN OF CARE
Problem: Chronic Conditions and Co-morbidities  Goal: Patient's chronic conditions and co-morbidity symptoms are monitored and maintained or improved  3/27/2025 1148 by Ronna Taveras RN  Outcome: Completed  3/27/2025 0515 by Shaunna Berrios RN  Outcome: Progressing     Problem: Discharge Planning  Goal: Discharge to home or other facility with appropriate resources  3/27/2025 1148 by Ronna Taveras RN  Outcome: Completed  3/27/2025 0515 by Shaunna Berrios RN  Outcome: Progressing     Problem: Pain  Goal: Verbalizes/displays adequate comfort level or baseline comfort level  3/27/2025 1148 by Ronna Taveras RN  Outcome: Completed  3/27/2025 0515 by Shaunna Berrios RN  Outcome: Progressing     Problem: Safety - Adult  Goal: Free from fall injury  3/27/2025 1148 by Ronna Taveras RN  Outcome: Completed  3/27/2025 0515 by Shaunna Berrios RN  Outcome: Progressing

## 2025-04-02 LAB — SURGICAL PATHOLOGY REPORT: NORMAL

## (undated) DEVICE — BAG SPEC LAP 9X7.5 IN 12 MM 1500 CC MEM WIRE CANN NYL

## (undated) DEVICE — GUIDEWIRE URO L150CM DIA .035IN STIFF NIT HYDRPHL STR TIP

## (undated) DEVICE — INTENDED FOR TISSUE SEPARATION, AND OTHER PROCEDURES THAT REQUIRE A SHARP SURGICAL BLADE TO PUNCTURE OR CUT.: Brand: BARD-PARKER ® CARBON RIB-BACK BLADES

## (undated) DEVICE — TRI-LUMEN FILTERED TUBE SET WITH ACTIVATED CHARCOAL FILTER: Brand: AIRSEAL

## (undated) DEVICE — SUTURE PDS II SZ 0 L60IN ABSRB VLT L65MM TP-1 1/2 CIR Z991G

## (undated) DEVICE — 3M™ IOBAN™ 2 ANTIMICROBIAL INCISE DRAPE 6650EZ: Brand: IOBAN™ 2

## (undated) DEVICE — TOWEL,OR,DSP,ST,NATURAL,DLX,4/PK,20PK/CS: Brand: MEDLINE

## (undated) DEVICE — STANDARD HYPODERMIC NEEDLE,ALUMINUM HUB: Brand: MONOJECT

## (undated) DEVICE — Device

## (undated) DEVICE — SEAL

## (undated) DEVICE — YANKAUER,POOLE TIP,STERILE,50/CS: Brand: MEDLINE

## (undated) DEVICE — BLADE CLIPPER GEN PURP NS

## (undated) DEVICE — ARM DRAPE

## (undated) DEVICE — LOOP VES W25MM THK1MM MAXI RED SIL FLD REPELLENT 100 PER

## (undated) DEVICE — APPLICATOR LAP 45CM FLX 2 VISTASEAL

## (undated) DEVICE — STAPLER SKIN L440MM 32MM LNG 12 FIRING B FRM PWR + GRIPPING

## (undated) DEVICE — RELOAD STPL L60MM H1-2.6MM MESENTERY THN TISS WHT 6 ROW

## (undated) DEVICE — WOUND RETRACTOR AND PROTECTOR: Brand: ALEXIS O WOUND PROTECTOR-RETRACTOR

## (undated) DEVICE — COVER,LIGHT HANDLE,FLX,2/PK: Brand: MEDLINE INDUSTRIES, INC.

## (undated) DEVICE — SEALANT TISS 10 ML FIBRIN VISTASEAL

## (undated) DEVICE — INSUFFLATION NEEDLE TO ESTABLISH PNEUMOPERITONEUM.: Brand: INSUFFLATION NEEDLE

## (undated) DEVICE — STRAP,CATHETER,ELASTIC,HOOK&LOOP: Brand: MEDLINE

## (undated) DEVICE — TROCAR: Brand: KII FIOS FIRST ENTRY

## (undated) DEVICE — SYRINGE MED 20ML STD CLR PLAS LUERLOCK TIP N CTRL DISP

## (undated) DEVICE — SINGLE-USE DIGITAL FLEXIBLE URETEROSCOPE: Brand: LITHOVUE

## (undated) DEVICE — CONTAINER,SPECIMEN,4OZ,OR STRL: Brand: MEDLINE

## (undated) DEVICE — KIT DETRGNT ENZYMATIC SOLUTION 100 ML SOAK SHLD 5 VI LG HUMD

## (undated) DEVICE — GARMENT,MEDLINE,DVT,INT,CALF,MED, GEN2: Brand: MEDLINE

## (undated) DEVICE — DRAINBAG,ANTI-REFLUX TOWER,L/F,2000ML,LL: Brand: MEDLINE

## (undated) DEVICE — SOLUTION ANTIFOG VIS SYS CLEARIFY LAPSCP

## (undated) DEVICE — APPLIER SUT CLP FOR 2-0 3-0 4-0 VCRL ABSRB SUT

## (undated) DEVICE — SYRINGE MED 10ML LUERLOCK TIP W/O SFTY DISP

## (undated) DEVICE — ELECTRO LUBE IS A SINGLE PATIENT USE DEVICE THAT IS INTENDED TO BE USED ON ELECTROSURGICAL ELECTRODES TO REDUCE STICKING.: Brand: KEY SURGICAL ELECTRO LUBE

## (undated) DEVICE — CYSTO/BLADDER IRRIGATION SET, REGULATING CLAMP

## (undated) DEVICE — 1LYRTR 16FR10ML100%SIL UMS SNP: Brand: MEDLINE INDUSTRIES, INC.

## (undated) DEVICE — APPLICATOR MEDICATED 26 CC SOLUTION HI LT ORNG CHLORAPREP

## (undated) DEVICE — SUTURE STRATAFIX SPRL PDS + VLT 3-0 15CM DISPOSABLE/SNGLE SXPP1B420

## (undated) DEVICE — LIQUIBAND RAPID ADHESIVE 36/CS 0.8ML: Brand: MEDLINE

## (undated) DEVICE — SUTURE STRATAFIX SPRL PDS + SZ 3 0 L6IN ABSRB VLT RB 1 L17MM SXPP1B422

## (undated) DEVICE — SINGLE ACTION PUMPING SYSTEM

## (undated) DEVICE — SUTURE VICRYL + SZ 3-0 L27IN ABSRB UD L26MM SH 1/2 CIR VCP416H

## (undated) DEVICE — SUTURE NONABSORBABLE MONOFILAMENT 4-0 RB-1 36 IN BLU PROLENE 8557H

## (undated) DEVICE — PROTECTOR ULN NRV PUR FOAM HK LOOP STRP ANATOMICALLY

## (undated) DEVICE — GLOVE ORTHO 7 1/2   MSG9475

## (undated) DEVICE — BLADELESS OBTURATOR: Brand: WECK VISTA

## (undated) DEVICE — DUAL LUMEN URETERAL CATHETER

## (undated) DEVICE — TIP COVER ACCESSORY

## (undated) DEVICE — CATHETER URETH 20FR BLLN 30CC 3 W F SPEC INF CTRL BARDX

## (undated) DEVICE — DRAPE,REIN 53X77,STERILE: Brand: MEDLINE

## (undated) DEVICE — SUTURE PDS II SZ 0 L27IN ABSRB VLT L36MM CT-1 1/2 CIR Z340H